# Patient Record
Sex: MALE | Race: WHITE | Employment: FULL TIME | URBAN - METROPOLITAN AREA
[De-identification: names, ages, dates, MRNs, and addresses within clinical notes are randomized per-mention and may not be internally consistent; named-entity substitution may affect disease eponyms.]

---

## 2017-01-20 DIAGNOSIS — E66.01 MORBID OBESITY DUE TO EXCESS CALORIES (H): ICD-10-CM

## 2017-01-20 DIAGNOSIS — E11.9 TYPE 2 DIABETES MELLITUS WITHOUT COMPLICATION (H): Primary | ICD-10-CM

## 2017-01-20 NOTE — TELEPHONE ENCOUNTER
Lisinopril      Last Written Prescription Date: 4/14/16  Last Fill Quantity: 90, # refills: 3  Last Office Visit with [facility], [facility] or [facility] prescribing provider: 4/14/16       POTASSIUM   Date Value Ref Range Status   04/14/2016 4.2 3.4 - 5.3 mmol/L Final     CREATININE   Date Value Ref Range Status   04/14/2016 0.60* 0.66 - 1.25 mg/dL Final     BP Readings from Last 3 Encounters:   04/14/16 110/84   03/20/15 120/78   11/20/14 120/80     Januvia         Last Written Prescription Date: 4/14/16  Last Fill Quantity: 90, # refills: 3  Last Office Visit with [facility], [facility] or [facility] prescribing provider:  4/14/16        BP Readings from Last 3 Encounters:   04/14/16 110/84   03/20/15 120/78   11/20/14 120/80     MICROL        8   8/23/2014  No results found for this basename: microalbumin  CREATININE   Date Value Ref Range Status   04/14/2016 0.60* 0.66 - 1.25 mg/dL Final   ]  GFR ESTIMATE   Date Value Ref Range Status   04/14/2016 >90  Non  GFR Calc   >60 mL/min/1.7m2 Final   08/23/2014 >90  Non  GFR Calc   >60 mL/min/1.7m2 Final   07/08/2013 >90 >60 mL/min/1.7m2 Final     GFR ESTIMATE IF BLACK   Date Value Ref Range Status   04/14/2016 >90   GFR Calc   >60 mL/min/1.7m2 Final   08/23/2014 >90   GFR Calc   >60 mL/min/1.7m2 Final   07/08/2013 >90 >60 mL/min/1.7m2 Final     CHOL      210   4/14/2016  HDL       34   4/14/2016  LDL      135   4/14/2016  TRIG      206   4/14/2016  CHOLHDLRATIO      4.3   8/23/2014  AST       17   8/23/2014  ALT       39   8/23/2014  A1C     10.8   4/14/2016  A1C      9.1   3/20/2015  A1C     10.0   8/23/2014  A1C      7.3   7/8/2013  POTASSIUM   Date Value Ref Range Status   04/14/2016 4.2 3.4 - 5.3 mmol/L Final       Labs showing if normal/abnormal  Lab Results   Component Value Date    UCRR 144 08/23/2014    MICROL 8 08/23/2014     Lab Results   Component Value Date    CHOL 210* 04/14/2016    TRIG 206* 04/14/2016    HDL 34*  04/14/2016    * 04/14/2016    VLDL 22 08/23/2014    CHOLHDLRATIO 4.3 08/23/2014     Lab Results   Component Value Date    A1C 10.8* 04/14/2016    A1C 9.1* 03/20/2015    A1C 10.0* 08/23/2014

## 2017-01-24 RX ORDER — LISINOPRIL 2.5 MG/1
2.5 TABLET ORAL DAILY
Qty: 30 TABLET | Refills: 0 | Status: SHIPPED | OUTPATIENT
Start: 2017-01-24 | End: 2017-04-25

## 2017-02-15 DIAGNOSIS — N52.1 ERECTILE DYSFUNCTION DUE TO DISEASES CLASSIFIED ELSEWHERE: ICD-10-CM

## 2017-02-15 RX ORDER — TADALAFIL 20 MG/1
20 TABLET ORAL DAILY
Qty: 18 TABLET | Refills: 0 | Status: CANCELLED | OUTPATIENT
Start: 2017-02-15

## 2017-02-15 NOTE — TELEPHONE ENCOUNTER
Cialis      Last Written Prescription Date: 12/02/16  Last Fill Quantity: 18,  # refills: 0   Last Office Visit with [facility], [facility] or [facility] prescribing provider: 04/14/16    Plan will probably only pay for Viagra

## 2017-02-16 RX ORDER — SILDENAFIL 100 MG/1
100 TABLET, FILM COATED ORAL DAILY PRN
Qty: 18 TABLET | Refills: 3 | Status: SHIPPED | OUTPATIENT
Start: 2017-02-16 | End: 2017-04-25 | Stop reason: ALTCHOICE

## 2017-03-26 DIAGNOSIS — E11.9 TYPE 2 DIABETES, HBA1C GOAL < 8% (H): ICD-10-CM

## 2017-03-26 NOTE — LETTER
4/14/2017     Riya Butt  [address] [address] [facility] [address]      Dear Riya:      We have been unable to reach you by phone. You are past due for an appointment and lab work. Please call [phone] when you can. Thank you            Precious ACMACHO

## 2017-03-27 NOTE — TELEPHONE ENCOUNTER
Metformin         Last Written Prescription Date: 4/14/16  Last Fill Quantity: 180, # refills: 3  Last Office Visit with [facility] or [facility] prescribing provider:  4/14/16      Per 4/19/16 phone note-Please advise pt A1C elevated to 10.8 and FLP elevated. Recommend adding once daily insulin to current medications. eRx sent, lantus solostar 20 U daily, referral to diabetes educator for instruction and to readdress diet issues. Add lipitor for cholesterol. See me in 1 month.  Leeanne Robin CNP    Called pt-left message     BP Readings from Last 3 Encounters:   04/14/16 110/84   03/20/15 120/78   11/20/14 120/80     Lab Results   Component Value Date    MICROL 8 08/23/2014     No results found for: MICROALBUMIN  Creatinine   Date Value Ref Range Status   04/14/2016 0.60 (L) 0.66 - 1.25 mg/dL Final   ]  GFR Estimate   Date Value Ref Range Status   04/14/2016 >90  Non  GFR Calc   >60 mL/min/1.7m2 Final   08/23/2014 >90  Non  GFR Calc   >60 mL/min/1.7m2 Final   07/08/2013 >90 >60 mL/min/1.7m2 Final     GFR Estimate If Black   Date Value Ref Range Status   04/14/2016 >90   GFR Calc   >60 mL/min/1.7m2 Final   08/23/2014 >90   GFR Calc   >60 mL/min/1.7m2 Final   07/08/2013 >90 >60 mL/min/1.7m2 Final     Lab Results   Component Value Date    CHOL 210 04/14/2016     Lab Results   Component Value Date    HDL 34 04/14/2016     Lab Results   Component Value Date     04/14/2016     Lab Results   Component Value Date    TRIG 206 04/14/2016     Lab Results   Component Value Date    CHOLHDLRATIO 4.3 08/23/2014     Lab Results   Component Value Date    AST 17 08/23/2014     Lab Results   Component Value Date    ALT 39 08/23/2014     Lab Results   Component Value Date    A1C 10.8 04/14/2016    A1C 9.1 03/20/2015    A1C 10.0 08/23/2014    A1C 7.3 07/08/2013     Potassium   Date Value Ref Range Status   04/14/2016 4.2 3.4 - 5.3 mmol/L Final       Labs showing if  normal/abnormal  Lab Results   Component Value Date    UCRR 144 08/23/2014    MICROL 8 08/23/2014     Lab Results   Component Value Date    CHOL 210 (H) 04/14/2016    TRIG 206 (H) 04/14/2016    HDL 34 (L) 04/14/2016     (H) 04/14/2016    VLDL 22 08/23/2014    CHOLHDLRATIO 4.3 08/23/2014     Lab Results   Component Value Date    A1C 10.8 (H) 04/14/2016    A1C 9.1 (H) 03/20/2015    A1C 10.0 (H) 08/23/2014

## 2017-04-03 NOTE — TELEPHONE ENCOUNTER
Call pt home number and left a message to call us back.  Called cell phone but couldn't leave a message VM box full.    Anson MANCERA RN

## 2017-04-07 NOTE — TELEPHONE ENCOUNTER
Attempted to contact pt. No VM at mobile phone.     Call to Iris, wife and left message for pt to call clinic.

## 2017-04-17 ENCOUNTER — TRANSFERRED RECORDS (OUTPATIENT)
Dept: HEALTH INFORMATION MANAGEMENT | Facility: CLINIC | Age: 47
End: 2017-04-17

## 2017-04-25 ENCOUNTER — OFFICE VISIT (OUTPATIENT)
Dept: INTERNAL MEDICINE | Facility: CLINIC | Age: 47
End: 2017-04-25
Payer: COMMERCIAL

## 2017-04-25 VITALS
DIASTOLIC BLOOD PRESSURE: 78 MMHG | HEART RATE: 84 BPM | OXYGEN SATURATION: 84 % | RESPIRATION RATE: 14 BRPM | HEIGHT: 68 IN | WEIGHT: 225 LBS | TEMPERATURE: 98.6 F | SYSTOLIC BLOOD PRESSURE: 128 MMHG | BODY MASS INDEX: 34.1 KG/M2

## 2017-04-25 DIAGNOSIS — E11.01 TYPE 2 DIABETES MELLITUS WITH HYPEROSMOLAR COMA, WITH LONG-TERM CURRENT USE OF INSULIN (H): ICD-10-CM

## 2017-04-25 DIAGNOSIS — I10 BENIGN ESSENTIAL HYPERTENSION: Primary | ICD-10-CM

## 2017-04-25 DIAGNOSIS — E11.00 TYPE 2 DIABETES MELLITUS WITH HYPEROSMOLARITY WITHOUT COMA, WITHOUT LONG-TERM CURRENT USE OF INSULIN (H): ICD-10-CM

## 2017-04-25 DIAGNOSIS — Z79.4 TYPE 2 DIABETES MELLITUS WITH HYPEROSMOLAR COMA, WITH LONG-TERM CURRENT USE OF INSULIN (H): ICD-10-CM

## 2017-04-25 DIAGNOSIS — E78.5 HYPERLIPIDEMIA WITH TARGET LDL LESS THAN 100: ICD-10-CM

## 2017-04-25 LAB
ERYTHROCYTE [DISTWIDTH] IN BLOOD BY AUTOMATED COUNT: 13.6 % (ref 10–15)
HBA1C MFR BLD: 10.1 % (ref 4.3–6)
HCT VFR BLD AUTO: 47.6 % (ref 40–53)
HGB BLD-MCNC: 16.7 G/DL (ref 13.3–17.7)
MCH RBC QN AUTO: 28.5 PG (ref 26.5–33)
MCHC RBC AUTO-ENTMCNC: 35.1 G/DL (ref 31.5–36.5)
MCV RBC AUTO: 81 FL (ref 78–100)
PLATELET # BLD AUTO: 272 10E9/L (ref 150–450)
RBC # BLD AUTO: 5.86 10E12/L (ref 4.4–5.9)
WBC # BLD AUTO: 8.8 10E9/L (ref 4–11)

## 2017-04-25 PROCEDURE — 99212 OFFICE O/P EST SF 10 MIN: CPT | Performed by: NURSE PRACTITIONER

## 2017-04-25 PROCEDURE — 85027 COMPLETE CBC AUTOMATED: CPT | Performed by: NURSE PRACTITIONER

## 2017-04-25 PROCEDURE — 80061 LIPID PANEL: CPT | Performed by: NURSE PRACTITIONER

## 2017-04-25 PROCEDURE — 84460 ALANINE AMINO (ALT) (SGPT): CPT | Performed by: NURSE PRACTITIONER

## 2017-04-25 PROCEDURE — 80048 BASIC METABOLIC PNL TOTAL CA: CPT | Performed by: NURSE PRACTITIONER

## 2017-04-25 PROCEDURE — 82043 UR ALBUMIN QUANTITATIVE: CPT | Performed by: NURSE PRACTITIONER

## 2017-04-25 PROCEDURE — 83036 HEMOGLOBIN GLYCOSYLATED A1C: CPT | Performed by: NURSE PRACTITIONER

## 2017-04-25 PROCEDURE — 36415 COLL VENOUS BLD VENIPUNCTURE: CPT | Performed by: NURSE PRACTITIONER

## 2017-04-25 RX ORDER — LISINOPRIL 2.5 MG/1
2.5 TABLET ORAL DAILY
Qty: 90 TABLET | Refills: 3 | Status: SHIPPED | OUTPATIENT
Start: 2017-04-25 | End: 2017-04-26

## 2017-04-25 NOTE — PROGRESS NOTES
SUBJECTIVE:                                                    Riya Butt is a 46 year old male who presents to clinic today for the following health issues:      Diabetes Follow-up    Patient is checking blood sugars: twice daily.    Blood sugar testing frequency justification: uncontrolled diabetes  Results are as follows:         am - 110-160              postprandial after supper-     Diabetic concerns: None     Symptoms of hypoglycemia (low blood sugar): none     Paresthesias (numbness or burning in feet) or sores: No     Date of last diabetic eye exam: 1 year ago     Hyperlipidemia Follow-Up      Rate your low fat/cholesterol diet?: good    Taking statin?  No    Other lipid medications/supplements?:  none     Hypertension Follow-up      Outpatient blood pressures are not being checked.    Low Salt Diet: no added salt       Amount of exercise or physical activity: daily activities    Problems taking medications regularly: No    Medication side effects: none    Diet: low salt and low fat/cholesterol          Problem list and histories reviewed & adjusted, as indicated.  Additional history: as documented        Reviewed and updated as needed this visit by clinical staff  Tobacco  Allergies  Meds  Med Hx  Surg Hx  Fam Hx  Soc Hx      Reviewed and updated as needed this visit by Provider           (I10) Benign essential hypertension  (primary encounter diagnosis)  Comment:   Plan: CBC with platelets, DISCONTINUED: lisinopril         (PRINIVIL/ZESTRIL) 2.5 MG tablet            (E11.9) Type 2 diabetes mellitus without complication (H)  Comment:   Plan:     (E78.5) Hyperlipidemia with target LDL less than 100  Comment:   Plan: Lipid Profile, ALT            (E11.00) Type 2 diabetes mellitus with hyperosmolarity without coma, without long-term current use of insulin (H)  Comment:   Plan: order for DME, Basic metabolic panel,         Hemoglobin A1c, Albumin Random Urine         Quantitative,  DISCONTINUED: sitagliptin         (JANUVIA) 100 MG tablet, DISCONTINUED:         metFORMIN (GLUCOPHAGE) 1000 MG tablet            Leeanne Robin CNP

## 2017-04-25 NOTE — MR AVS SNAPSHOT
"              After Visit Summary   4/25/2017    Riya Butt    MRN: [id number]           Patient Information     Date Of Birth          1970        Visit Information        Provider Department      4/25/2017 4:00 PM Leeanne Robin NP [facility]        Today's Diagnoses     Benign essential hypertension    -  1    Hyperlipidemia with target LDL less than 100        Type 2 diabetes mellitus with hyperosmolarity without coma, without long-term current use of insulin (H)        Type 2 diabetes mellitus with hyperosmolar coma, with long-term current use of insulin (H)           Follow-ups after your visit        Who to contact     If you have questions or need follow up information about today's clinic visit or your schedule please contact [facility] directly at [phone].  Normal or non-critical lab and imaging results will be communicated to you by Mindframehart, letter or phone within 4 business days after the clinic has received the results. If you do not hear from us within 7 days, please contact the clinic through Mindframehart or phone. If you have a critical or abnormal lab result, we will notify you by phone as soon as possible.  Submit refill requests through [email or web address] or call your pharmacy and they will forward the refill request to us. Please allow 3 business days for your refill to be completed.          Additional Information About Your Visit        MyCharAposense Information     [email or web address] lets you send messages to your doctor, view your test results, renew your prescriptions, schedule appointments and more. To sign up, go to [email or web address] . Click on \"Log in\" on the left side of the screen, which will take you to the Welcome page. Then click on \"Sign up Now\" on the right side of the page.     You will be asked to enter the access code listed below, as well as some personal information. Please follow the directions to create your username and password.     Your access " "code is: [email or web address]  Expires: 2018  1:25 PM     Your access code will  in 90 days. If you need help or a new code, please call your [facility] or [phone].        Care EveryWhere ID     This is your Care EveryWhere ID. This could be used by other organizations to access your [facility] medical records  [id number]        Your Vitals Were     Pulse Temperature Respirations Height Pulse Oximetry BMI (Body Mass Index)    84 98.6  F (37  C) (Oral) 14 5' 8\" (1.727 m) 84% 34.21 kg/m2       Blood Pressure from Last 3 Encounters:   17 128/78   16 110/84   03/20/15 120/78    Weight from Last 3 Encounters:   17 225 lb (102.1 kg)   16 233 lb 14.4 oz (106.1 kg)   03/20/15 237 lb (107.5 kg)              We Performed the Following     Albumin Random Urine Quantitative     ALT     Basic metabolic panel     CBC with platelets     Hemoglobin A1c     Lipid Profile          Today's Medication Changes          These changes are accurate as of: 17 11:59 PM.  If you have any questions, ask your nurse or doctor.               Stop taking these medicines if you haven't already. Please contact your care team if you have questions.     sildenafil 100 MG tablet   Commonly known as:  VIAGRA   Stopped by:  Leeanne Robin NP                Where to get your medicines      These medications were sent to [facility] [address] [address]     Phone:  [phone]     lisinopril 2.5 MG tablet    metFORMIN 1000 MG tablet    sitagliptin 100 MG tablet         Some of these will need a paper prescription and others can be bought over the counter.  Ask your nurse if you have questions.     Bring a paper prescription for each of these medications     order for DME                Primary Care Provider Office Phone # Fax #    Whitley Bush [phone]       [address]  [facility] [address]      "   Equal Access to Services     [facility] Center[facility] : Hadii aad ku hadalekseykarie Rejiali, waaxda luqadaha, qaybta kaelinagavino ness, yoan tracy. So [facility] [phone].    ATENCIÓN: Si habla español, tiene a portillo disposición servicios gratuitos de asistencia lingüística. Fortinoame al [phone].    We comply with applicable federal civil rights laws and [place] laws. We do not discriminate on the basis of race, color, national origin, age, disability, sex, sexual orientation, or gender identity.            Thank you!     Thank you for choosing [facility]  for your care. Our goal is always to provide you with excellent care. Hearing back from our patients is one way we can continue to improve our services. Please take a few minutes to complete the written survey that you may receive in the mail after your visit with us. Thank you!             Your Updated Medication List - Protect others around you: Learn how to safely use, store and throw away your medicines at www.disposemymeds.org.          This list is accurate as of: 4/25/17 11:59 PM.  Always use your most recent med list.                   Brand Name Dispense Instructions for use Diagnosis    insulin pen needle 31G X 6 MM     100 each    Use  daily or as directed.    Type 2 diabetes mellitus without complication (H)       lisinopril 2.5 MG tablet    PRINIVIL/Zestril    90 tablet    Take 1 tablet (2.5 mg) by mouth daily    Benign essential hypertension       metFORMIN 1000 MG tablet    GLUCOPHAGE    180 tablet    Take 1 tablet (1,000 mg) by mouth 2 times daily (with meals)    Type 2 diabetes mellitus with hyperosmolarity without coma, without long-term current use of insulin (H)       * order for DME     3 Month    Equipment being ordered: All diabetic testing supplies including test strips, lancets and solution for testing 3 times per day.    Type 2 diabetes, HbA1C goal < 8% (H)       * order for DME     1 each    Glucometer,  brand as covered by insurance.    Type 2 diabetes mellitus with hyperosmolarity without coma, without long-term current use of insulin (H)       sitagliptin 100 MG tablet    JANUVIA    90 tablet    Take 1 tablet (100 mg) by mouth daily    Type 2 diabetes mellitus with hyperosmolarity without coma, without long-term current use of insulin (H)       * Notice:  This list has 2 medication(s) that are the same as other medications prescribed for you. Read the directions carefully, and ask your doctor or other care provider to review them with you.

## 2017-04-25 NOTE — NURSING NOTE
"Chief Complaint   Patient presents with     Diabetes     Hypertension     Lipids       Initial /78 (BP Location: Right arm, Patient Position: Chair, Cuff Size: Adult Large)  Pulse 84  Temp 98.6  F (37  C) (Oral)  Resp 14  Ht 5' 8\" (1.727 m)  Wt 225 lb (102.1 kg)  SpO2 (!) 84%  BMI 34.21 kg/m2 Estimated body mass index is 34.21 kg/(m^2) as calculated from the following:    Height as of this encounter: 5' 8\" (1.727 m).    Weight as of this encounter: 225 lb (102.1 kg).  Medication Reconciliation: complete   Charleen MACIEL      "

## 2017-04-26 ENCOUNTER — TELEPHONE (OUTPATIENT)
Dept: INTERNAL MEDICINE | Facility: CLINIC | Age: 47
End: 2017-04-26

## 2017-04-26 DIAGNOSIS — E11.00 TYPE 2 DIABETES MELLITUS WITH HYPEROSMOLARITY WITHOUT COMA, WITHOUT LONG-TERM CURRENT USE OF INSULIN (H): ICD-10-CM

## 2017-04-26 DIAGNOSIS — E11.9 TYPE 2 DIABETES MELLITUS WITHOUT COMPLICATION, WITHOUT LONG-TERM CURRENT USE OF INSULIN (H): Primary | ICD-10-CM

## 2017-04-26 DIAGNOSIS — E78.5 HYPERLIPIDEMIA WITH TARGET LDL LESS THAN 100: ICD-10-CM

## 2017-04-26 DIAGNOSIS — I10 BENIGN ESSENTIAL HYPERTENSION: ICD-10-CM

## 2017-04-26 LAB
ALT SERPL W P-5'-P-CCNC: 41 U/L (ref 0–70)
ANION GAP SERPL CALCULATED.3IONS-SCNC: 7 MMOL/L (ref 3–14)
BUN SERPL-MCNC: 10 MG/DL (ref 7–30)
CALCIUM SERPL-MCNC: 9 MG/DL (ref 8.5–10.1)
CHLORIDE SERPL-SCNC: 102 MMOL/L (ref 94–109)
CHOLEST SERPL-MCNC: 210 MG/DL
CO2 SERPL-SCNC: 28 MMOL/L (ref 20–32)
CREAT SERPL-MCNC: 0.69 MG/DL (ref 0.66–1.25)
CREAT UR-MCNC: 96 MG/DL
GFR SERPL CREATININE-BSD FRML MDRD: ABNORMAL ML/MIN/1.7M2
GLUCOSE SERPL-MCNC: 193 MG/DL (ref 70–99)
HDLC SERPL-MCNC: 37 MG/DL
LDLC SERPL CALC-MCNC: 153 MG/DL
MICROALBUMIN UR-MCNC: 12 MG/L
MICROALBUMIN/CREAT UR: 12.6 MG/G CR (ref 0–17)
NONHDLC SERPL-MCNC: 173 MG/DL
POTASSIUM SERPL-SCNC: 4.1 MMOL/L (ref 3.4–5.3)
SODIUM SERPL-SCNC: 137 MMOL/L (ref 133–144)
TRIGL SERPL-MCNC: 102 MG/DL

## 2017-04-26 RX ORDER — ATORVASTATIN CALCIUM 10 MG/1
10 TABLET, FILM COATED ORAL DAILY
Qty: 90 TABLET | Refills: 1 | Status: SHIPPED | OUTPATIENT
Start: 2017-04-26 | End: 2017-10-03

## 2017-04-26 RX ORDER — LISINOPRIL 2.5 MG/1
2.5 TABLET ORAL DAILY
Qty: 14 TABLET | Refills: 0 | Status: SHIPPED | OUTPATIENT
Start: 2017-04-26 | End: 2018-06-14

## 2017-04-26 RX ORDER — PIOGLITAZONEHYDROCHLORIDE 30 MG/1
30 TABLET ORAL DAILY
Qty: 90 TABLET | Refills: 3 | Status: SHIPPED | OUTPATIENT
Start: 2017-04-26 | End: 2018-01-17

## 2017-04-26 NOTE — TELEPHONE ENCOUNTER
Pt calling.  Waiting for mail-order to arrive.  Needs 14 day supply faxed to local pharm in the mean time.    Lisinopril, Januvia, and Metformin faxed to local pharm for 14 day supply.

## 2017-04-26 NOTE — TELEPHONE ENCOUNTER
Please advise pt that A1C is 10.1 and cholesterol elevated.  New eRx daily actos and refill lipitor sent to mail order pharmacy.  Recheck labs in 3 months  Leeanne Robin CNP

## 2017-05-09 DIAGNOSIS — I10 BENIGN ESSENTIAL HYPERTENSION: ICD-10-CM

## 2017-05-09 DIAGNOSIS — E11.00 TYPE 2 DIABETES MELLITUS WITH HYPEROSMOLARITY WITHOUT COMA, WITHOUT LONG-TERM CURRENT USE OF INSULIN (H): ICD-10-CM

## 2017-05-09 RX ORDER — SITAGLIPTIN 100 MG/1
TABLET, FILM COATED ORAL
Qty: 14 TABLET | Refills: 0 | OUTPATIENT
Start: 2017-05-09

## 2017-05-09 RX ORDER — LISINOPRIL 2.5 MG/1
TABLET ORAL
Qty: 14 TABLET | Refills: 0 | OUTPATIENT
Start: 2017-05-09

## 2017-05-09 NOTE — TELEPHONE ENCOUNTER
Lisinopril      Last Written Prescription Date: 04/26/17  Last Fill Quantity: 14, # refills: 0  Last Office Visit with [facility], [facility] or [facility] prescribing provider: 04/25/17       Potassium   Date Value Ref Range Status   04/25/2017 4.1 3.4 - 5.3 mmol/L Final     Creatinine   Date Value Ref Range Status   04/25/2017 0.69 0.66 - 1.25 mg/dL Final     BP Readings from Last 3 Encounters:   04/25/17 128/78   04/14/16 110/84   03/20/15 120/78     Metformin  AND Januvia       Last Written Prescription Date: 04/26/17  BOTH  Last Fill Quantity: 28  AND 14, # refills: 0  BOTH  Last Office Visit with [facility], [facility] or [facility] prescribing provider:  04/25/17        BP Readings from Last 3 Encounters:   04/25/17 128/78   04/14/16 110/84   03/20/15 120/78     Lab Results   Component Value Date    MICROL 12 04/25/2017     Lab Results   Component Value Date    UMALCR 12.60 04/25/2017     Creatinine   Date Value Ref Range Status   04/25/2017 0.69 0.66 - 1.25 mg/dL Final   ]  GFR Estimate   Date Value Ref Range Status   04/25/2017 >90  Non  GFR Calc   >60 mL/min/1.7m2 Final   04/14/2016 >90  Non  GFR Calc   >60 mL/min/1.7m2 Final   08/23/2014 >90  Non  GFR Calc   >60 mL/min/1.7m2 Final     GFR Estimate If Black   Date Value Ref Range Status   04/25/2017 >90   GFR Calc   >60 mL/min/1.7m2 Final   04/14/2016 >90   GFR Calc   >60 mL/min/1.7m2 Final   08/23/2014 >90   GFR Calc   >60 mL/min/1.7m2 Final     Lab Results   Component Value Date    CHOL 210 04/25/2017     Lab Results   Component Value Date    HDL 37 04/25/2017     Lab Results   Component Value Date     04/25/2017     Lab Results   Component Value Date    TRIG 102 04/25/2017     Lab Results   Component Value Date    CHOLHDLRATIO 4.3 08/23/2014     Lab Results   Component Value Date    AST 17 08/23/2014     Lab Results   Component Value Date    ALT 41 04/25/2017     Lab Results    Component Value Date    A1C 10.1 04/25/2017    A1C 10.8 04/14/2016    A1C 9.1 03/20/2015    A1C 10.0 08/23/2014    A1C 7.3 07/08/2013     Potassium   Date Value Ref Range Status   04/25/2017 4.1 3.4 - 5.3 mmol/L Final       Labs showing if normal/abnormal  Lab Results   Component Value Date    UCRR 96 04/25/2017    MICROL 12 04/25/2017     Lab Results   Component Value Date    CHOL 210 (H) 04/25/2017    TRIG 102 04/25/2017    HDL 37 (L) 04/25/2017     (H) 04/25/2017    VLDL 22 08/23/2014    CHOLHDLRATIO 4.3 08/23/2014     Lab Results   Component Value Date    A1C 10.1 (H) 04/25/2017    A1C 10.8 (H) 04/14/2016    A1C 9.1 (H) 03/20/2015

## 2017-05-09 NOTE — TELEPHONE ENCOUNTER
Per 4/26 refill entry-Pt calling. Waiting for mail-order to arrive. Needs 14 day supply faxed to local pharm in the mean time.     Lisinopril, Januvia, and Metformin faxed to local pharm for 14 day supply.

## 2017-07-14 DIAGNOSIS — E11.00 TYPE 2 DIABETES MELLITUS WITH HYPEROSMOLARITY WITHOUT COMA, WITHOUT LONG-TERM CURRENT USE OF INSULIN (H): ICD-10-CM

## 2017-07-14 NOTE — TELEPHONE ENCOUNTER
ONE TOUCH ULTRA         Last Written Prescription Date: 03/20/15  Last Fill Quantity: 3 MONTHS, # refills: 1  Last Office Visit with [facility], [facility] or [facility] prescribing provider:  04/25/17        BP Readings from Last 3 Encounters:   04/25/17 128/78   04/14/16 110/84   03/20/15 120/78     Lab Results   Component Value Date    MICROL 12 04/25/2017     Lab Results   Component Value Date    UMALCR 12.60 04/25/2017     Creatinine   Date Value Ref Range Status   04/25/2017 0.69 0.66 - 1.25 mg/dL Final   ]  GFR Estimate   Date Value Ref Range Status   04/25/2017 >90  Non  GFR Calc   >60 mL/min/1.7m2 Final   04/14/2016 >90  Non  GFR Calc   >60 mL/min/1.7m2 Final   08/23/2014 >90  Non  GFR Calc   >60 mL/min/1.7m2 Final     GFR Estimate If Black   Date Value Ref Range Status   04/25/2017 >90   GFR Calc   >60 mL/min/1.7m2 Final   04/14/2016 >90   GFR Calc   >60 mL/min/1.7m2 Final   08/23/2014 >90   GFR Calc   >60 mL/min/1.7m2 Final     Lab Results   Component Value Date    CHOL 210 04/25/2017     Lab Results   Component Value Date    HDL 37 04/25/2017     Lab Results   Component Value Date     04/25/2017     Lab Results   Component Value Date    TRIG 102 04/25/2017     Lab Results   Component Value Date    CHOLHDLRATIO 4.3 08/23/2014     Lab Results   Component Value Date    AST 17 08/23/2014     Lab Results   Component Value Date    ALT 41 04/25/2017     Lab Results   Component Value Date    A1C 10.1 04/25/2017    A1C 10.8 04/14/2016    A1C 9.1 03/20/2015    A1C 10.0 08/23/2014    A1C 7.3 07/08/2013     Potassium   Date Value Ref Range Status   04/25/2017 4.1 3.4 - 5.3 mmol/L Final

## 2017-10-03 DIAGNOSIS — E11.00 TYPE 2 DIABETES MELLITUS WITH HYPEROSMOLARITY WITHOUT COMA, WITHOUT LONG-TERM CURRENT USE OF INSULIN (H): ICD-10-CM

## 2017-10-03 DIAGNOSIS — E78.5 HYPERLIPIDEMIA WITH TARGET LDL LESS THAN 100: ICD-10-CM

## 2017-10-03 NOTE — TELEPHONE ENCOUNTER
Per 4/26 phone note-Please advise pt that A1C is 10.1 and cholesterol elevated.  New eRx daily actos and refill lipitor sent to mail order pharmacy.  Recheck labs in 3 months  Leeanne Robin CNP    Called pt-left message

## 2017-10-03 NOTE — TELEPHONE ENCOUNTER
One Touch Ultra test strips      Last Written Prescription Date: 07.17.17  Last Fill Quantity: 300,  # refills: 0   Last Office Visit with G, [facility] or [facility] prescribing provider: 04/25/17

## 2017-10-03 NOTE — TELEPHONE ENCOUNTER
High A1C      Test strips      Last Written Prescription Date: 7/17/17  Last Fill Quantity: 300,  # refills: 0   Last Office Visit with [facility], [facility] or [facility] prescribing provider: 4/25/17                                                   Per 4/26 phone note-Please advise pt that A1C is 10.1 and cholesterol elevated.  New eRx daily actos and refill lipitor sent to mail order pharmacy.  Recheck labs in 3 months  Leeanne Robin CNP    Called pt-left message

## 2017-10-03 NOTE — TELEPHONE ENCOUNTER
Atorvastatin     Last Written Prescription Date: 04/26/17  Last Fill Quantity: 90, # refills: 1  Last Office Visit with [facility] or [facility] prescribing provider: 04/25/17       Lab Results   Component Value Date    CHOL 210 04/25/2017     Lab Results   Component Value Date    HDL 37 04/25/2017     Lab Results   Component Value Date     04/25/2017     Lab Results   Component Value Date    TRIG 102 04/25/2017     Lab Results   Component Value Date    CHOLHDLRATIO 4.3 08/23/2014       Labs showing if normal/abnormal  Lab Results   Component Value Date    CHOL 210 (H) 04/25/2017    TRIG 102 04/25/2017    HDL 37 (L) 04/25/2017     (H) 04/25/2017    VLDL 22 08/23/2014    CHOLHDLRATIO 4.3 08/23/2014

## 2017-10-06 ENCOUNTER — TELEPHONE (OUTPATIENT)
Dept: INTERNAL MEDICINE | Facility: CLINIC | Age: 47
End: 2017-10-06

## 2017-10-06 DIAGNOSIS — E11.9 TYPE 2 DIABETES MELLITUS WITHOUT COMPLICATION, WITHOUT LONG-TERM CURRENT USE OF INSULIN (H): Primary | ICD-10-CM

## 2017-10-06 RX ORDER — LANCETS 28 GAUGE
EACH MISCELLANEOUS
Qty: 300 EACH | Refills: 0 | Status: SHIPPED | OUTPATIENT
Start: 2017-10-06 | End: 2018-06-14

## 2017-10-06 NOTE — TELEPHONE ENCOUNTER
[facility] with [facility] left voice message at [phone]. They received prescription for One Touch Ultra Test strips, but these are not covered by insurance unless pt uses them with an insulin pump. They state preferred alternative is FreeStyle LIte. Requesting call back if okay to change or if pt is using a insulin pump.  Phone: [phone]  Reference # [id number]    Contacted embraase, spoke to [facility]. Advised pt is not using insulin pump and okay to change to FreeStyle Lite test strips. Asked if they need new script for glucometer, [facility] states they will send new glucometer for pt at no additional charge.     Dorcas Foy, Pharmacist took verbal order for test strips and will also include a new meter. They will need new prescription for FreeStyle Lancets. New prescription sent to pharmacy for lancets.

## 2017-10-27 RX ORDER — ATORVASTATIN CALCIUM 10 MG/1
TABLET, FILM COATED ORAL
Qty: 90 TABLET | Refills: 0 | Status: SHIPPED | OUTPATIENT
Start: 2017-10-27 | End: 2018-01-17

## 2017-10-27 NOTE — TELEPHONE ENCOUNTER
Pt also due for diabetes follow up.  Called cell phone, his wife answered. Consent to communicate on file for her. Informed her pt due for office visit and labs. She scheduled an appt for pt.   Next 5 appointments (look out 90 days)     Nov 17, 2017  8:20 AM CST   [facility] Visit with Whitley Bush MD   [facility]    [address]  [facility] [id number] [phone]                 Medication is being filled for 1 time refill only due to:  future appt scheduled

## 2018-01-04 PROBLEM — E11.00 TYPE 2 DIABETES MELLITUS WITH HYPEROSMOLARITY WITHOUT COMA, WITHOUT LONG-TERM CURRENT USE OF INSULIN (H): Status: ACTIVE | Noted: 2018-01-04

## 2018-01-17 DIAGNOSIS — I10 BENIGN ESSENTIAL HYPERTENSION: ICD-10-CM

## 2018-01-17 DIAGNOSIS — E11.00 TYPE 2 DIABETES MELLITUS WITH HYPEROSMOLARITY WITHOUT COMA, WITHOUT LONG-TERM CURRENT USE OF INSULIN (H): ICD-10-CM

## 2018-01-17 DIAGNOSIS — E11.9 TYPE 2 DIABETES MELLITUS WITHOUT COMPLICATION, WITHOUT LONG-TERM CURRENT USE OF INSULIN (H): ICD-10-CM

## 2018-01-17 DIAGNOSIS — N52.1 ERECTILE DYSFUNCTION DUE TO DISEASES CLASSIFIED ELSEWHERE: Primary | ICD-10-CM

## 2018-01-17 DIAGNOSIS — E78.5 HYPERLIPIDEMIA WITH TARGET LDL LESS THAN 100: ICD-10-CM

## 2018-01-23 NOTE — TELEPHONE ENCOUNTER
Pt is due for OV, labs.   One no show in November.   Attempted to contact pt. Left message to call clinic.         Lab Results   Component Value Date    A1C 10.1 04/25/2017    A1C 10.8 04/14/2016    A1C 9.1 03/20/2015    A1C 10.0 08/23/2014    A1C 7.3 07/08/2013

## 2018-01-25 NOTE — TELEPHONE ENCOUNTER
"Requested Prescriptions   Pending Prescriptions Disp Refills     atorvastatin (LIPITOR) 10 MG tablet [Pharmacy Med Name: ATORVASTATIN TABS 10MG] 90 tablet 0     Sig: TAKE 1 TABLET DAILY    Statins Protocol Passed    1/17/2018  6:19 PM       Passed - LDL on file in past 12 months    Recent Labs   Lab Test  04/25/17   1639   LDL  153*            Passed - No abnormal creatine kinase in past 12 months    No lab results found.         Passed - Recent or future visit with authorizing provider    Patient had office visit in the last year or has a visit in the next 30 days with authorizing provider.  See \"Patient Info\" tab in inbasket, or \"Choose Columns\" in Meds & Orders section of the refill encounter.            Passed - Patient is age 18 or older        Last office visit 4/25/17.      Routing refill request to provider for review/approval because:  Shirley given x1 and patient did not follow up, please advise.  Pt is due for fasting labs and diabetic follow up.  Nadeen Marshall RN          "

## 2018-01-26 RX ORDER — ATORVASTATIN CALCIUM 10 MG/1
TABLET, FILM COATED ORAL
Qty: 90 TABLET | Refills: 0 | Status: SHIPPED | OUTPATIENT
Start: 2018-01-26 | End: 2018-06-14

## 2018-02-22 RX ORDER — SITAGLIPTIN 100 MG/1
TABLET, FILM COATED ORAL
Qty: 90 TABLET | Refills: 0 | Status: SHIPPED | OUTPATIENT
Start: 2018-02-22 | End: 2018-06-14

## 2018-02-22 RX ORDER — PIOGLITAZONEHYDROCHLORIDE 30 MG/1
TABLET ORAL
Qty: 90 TABLET | Refills: 0 | Status: SHIPPED | OUTPATIENT
Start: 2018-02-22 | End: 2018-08-13

## 2018-02-22 RX ORDER — LISINOPRIL 2.5 MG/1
TABLET ORAL
Qty: 90 TABLET | Refills: 0 | Status: SHIPPED | OUTPATIENT
Start: 2018-02-22 | End: 2018-06-14

## 2018-02-22 NOTE — TELEPHONE ENCOUNTER
No future appt scheduled.  Letter mailed to pt on 2-7-18 to schedule appt.    Routed to Leeanne Robin NP re: refill requests.    Please advise, thanks.

## 2018-03-21 RX ORDER — SILDENAFIL 100 MG/1
TABLET, FILM COATED ORAL
Qty: 18 TABLET | Refills: 0 | Status: SHIPPED | OUTPATIENT
Start: 2018-03-21 | End: 2018-06-14

## 2018-03-21 NOTE — TELEPHONE ENCOUNTER
Pt has not called back to schedule an appt.   Routing refill request to provider for review/approval because:  Patient needs to be seen because:  Due for diabetes appt.

## 2018-06-14 ENCOUNTER — OFFICE VISIT (OUTPATIENT)
Dept: INTERNAL MEDICINE | Facility: CLINIC | Age: 48
End: 2018-06-14
Payer: COMMERCIAL

## 2018-06-14 VITALS
BODY MASS INDEX: 35.61 KG/M2 | OXYGEN SATURATION: 95 % | RESPIRATION RATE: 18 BRPM | SYSTOLIC BLOOD PRESSURE: 120 MMHG | TEMPERATURE: 98.3 F | WEIGHT: 235 LBS | DIASTOLIC BLOOD PRESSURE: 80 MMHG | HEART RATE: 80 BPM | HEIGHT: 68 IN

## 2018-06-14 DIAGNOSIS — N52.1 ERECTILE DYSFUNCTION DUE TO DISEASES CLASSIFIED ELSEWHERE: ICD-10-CM

## 2018-06-14 DIAGNOSIS — I10 BENIGN ESSENTIAL HYPERTENSION: ICD-10-CM

## 2018-06-14 DIAGNOSIS — E78.5 HYPERLIPIDEMIA WITH TARGET LDL LESS THAN 100: ICD-10-CM

## 2018-06-14 DIAGNOSIS — E11.9 TYPE 2 DIABETES MELLITUS WITHOUT COMPLICATION, WITHOUT LONG-TERM CURRENT USE OF INSULIN (H): ICD-10-CM

## 2018-06-14 DIAGNOSIS — E11.00 TYPE 2 DIABETES MELLITUS WITH HYPEROSMOLARITY WITHOUT COMA, WITHOUT LONG-TERM CURRENT USE OF INSULIN (H): ICD-10-CM

## 2018-06-14 PROCEDURE — 99214 OFFICE O/P EST MOD 30 MIN: CPT | Performed by: PHYSICIAN ASSISTANT

## 2018-06-14 RX ORDER — LISINOPRIL 2.5 MG/1
2.5 TABLET ORAL DAILY
Qty: 90 TABLET | Refills: 0 | Status: SHIPPED | OUTPATIENT
Start: 2018-06-14 | End: 2018-08-15

## 2018-06-14 RX ORDER — ATORVASTATIN CALCIUM 10 MG/1
10 TABLET, FILM COATED ORAL DAILY
Qty: 90 TABLET | Refills: 0 | Status: SHIPPED | OUTPATIENT
Start: 2018-06-14 | End: 2018-08-15

## 2018-06-14 RX ORDER — LANCETS 28 GAUGE
EACH MISCELLANEOUS
Qty: 300 EACH | Refills: 0 | Status: SHIPPED | OUTPATIENT
Start: 2018-06-14

## 2018-06-14 RX ORDER — SILDENAFIL 100 MG/1
TABLET, FILM COATED ORAL
Qty: 18 TABLET | Refills: 0 | Status: SHIPPED | OUTPATIENT
Start: 2018-06-14 | End: 2018-08-15

## 2018-06-14 NOTE — NURSING NOTE
"Chief Complaint   Patient presents with     Recheck Medication     initial /80  Pulse 80  Temp 98.3  F (36.8  C) (Oral)  Resp 18  Ht 5' 8\" (1.727 m)  Wt 235 lb (106.6 kg)  SpO2 95%  BMI 35.73 kg/m2 Estimated body mass index is 35.73 kg/(m^2) as calculated from the following:    Height as of this encounter: 5' 8\" (1.727 m).    Weight as of this encounter: 235 lb (106.6 kg)..  bp completed using cuff size large  QUYEN GARRISON LPN  "

## 2018-06-14 NOTE — MR AVS SNAPSHOT
After Visit Summary   6/14/2018    Riya Butt    MRN: [id number]           Patient Information     Date Of Birth          1970        Visit Information        Provider Department      6/14/2018 2:00 PM Lynsey Sheriff PA-C [facility]        Today's Diagnoses     Hyperlipidemia with target LDL less than 100        Type 2 diabetes mellitus without complication, without long-term current use of insulin (H)        Type 2 diabetes mellitus with hyperosmolarity without coma, without long-term current use of insulin (H)        Benign essential hypertension        Erectile dysfunction due to diseases classified elsewhere          Care Instructions    I will refill your medications today and have you follow up for fasting lab work tomorrow. You will need to follow up for recheck of your diabetes in 3-6 months depending on control.               Follow-ups after your visit        Follow-up notes from your care team     Return for Lab Work.      Future tests that were ordered for you today     Open Future Orders        Priority Expected Expires Ordered    Lipid panel reflex to direct LDL Fasting Routine 12/11/2018 1/10/2019 6/14/2018    **Comprehensive metabolic panel FUTURE 6mo Routine 12/11/2018 1/10/2019 6/14/2018    Hemoglobin A1c Routine  6/14/2019 6/14/2018    Albumin Random Urine Quantitative with Creat Ratio Routine 12/11/2018 1/10/2019 6/14/2018    **TSH with free T4 reflex FUTURE 6mo Routine 12/11/2018 1/10/2019 6/14/2018    PSA, screen Routine  6/14/2019 6/14/2018            Who to contact     If you have questions or need follow up information about today's clinic visit or your schedule please contact [facility] directly at [phone].  Normal or non-critical lab and imaging results will be communicated to you by MyChart, letter or phone within 4 business days after the clinic has received the results. If you do not hear from us within 7 days, please  "contact the clinic through [employer] or phone. If you have a critical or abnormal lab result, we will notify you by phone as soon as possible.  Submit refill requests through [employer] or call your pharmacy and they will forward the refill request to us. Please allow 3 business days for your refill to be completed.          Additional Information About Your Visit        Care EveryWhere ID     This is your Care EveryWhere ID. This could be used by other organizations to access your [facility] medical records  [id number]        Your Vitals Were     Pulse Temperature Respirations Height Pulse Oximetry BMI (Body Mass Index)    80 98.3  F (36.8  C) (Oral) 18 5' 8\" (1.727 m) 95% 35.73 kg/m2       Blood Pressure from Last 3 Encounters:   06/14/18 120/80   04/25/17 128/78   04/14/16 110/84    Weight from Last 3 Encounters:   06/14/18 235 lb (106.6 kg)   04/25/17 225 lb (102.1 kg)   04/14/16 233 lb 14.4 oz (106.1 kg)                 Today's Medication Changes          These changes are accurate as of 6/14/18  2:22 PM.  If you have any questions, ask your nurse or doctor.               These medicines have changed or have updated prescriptions.        Dose/Directions    atorvastatin 10 MG tablet   Commonly known as:  LIPITOR   This may have changed:  See the new instructions.   Used for:  Hyperlipidemia with target LDL less than 100   Changed by:  Lynsey Sheriff PA-C        Dose:  10 mg   Take 1 tablet (10 mg) by mouth daily   Quantity:  90 tablet   Refills:  0       sitagliptin 100 MG tablet   Commonly known as:  JANUVIA   This may have changed:  See the new instructions.   Used for:  Type 2 diabetes mellitus with hyperosmolarity without coma, without long-term current use of insulin (H)   Changed by:  Lynsey Sheriff PA-C        Dose:  100 mg   Take 1 tablet (100 mg) by mouth daily   Quantity:  90 tablet   Refills:  0            Where to get your medicines      These medications were sent to [facility] - [address] [address]     Phone:  [phone]     atorvastatin 10 MG tablet    blood glucose monitoring lancets    blood glucose monitoring test strip    insulin pen needle 31G X 6 MM    lisinopril 2.5 MG tablet    metFORMIN 1000 MG tablet    sildenafil 100 MG tablet    sitagliptin 100 MG tablet                Primary Care Provider Office Phone # Fax #    Whitley Bush [phone]       [address] [facility] [address]        Equal Access to Services     [facility]: Hadii aad ku hadasho Soomaali, waaxda luqadaha, qaybta kaalmada adeegyada, waxay idiin hayaan adeeg kharaelisa castillo . So [facility] [phone].    ATENCIÓN: Si habla español, tiene a portillo disposición servicios gratuitos de asistencia lingüística. [facility] [phone].    We comply with applicable federal civil rights laws and [place] laws. We do not discriminate on the basis of race, color, national origin, age, disability, sex, sexual orientation, or gender identity.            Thank you!     Thank you for choosing [facility]  for your care. Our goal is always to provide you with excellent care. Hearing back from our patients is one way we can continue to improve our services. Please take a few minutes to complete the written survey that you may receive in the mail after your visit with us. Thank you!             Your Updated Medication List - Protect others around you: Learn how to safely use, store and throw away your medicines at www.disposemymeds.org.          This list is accurate as of 6/14/18  2:22 PM.  Always use your most recent med list.                   Brand Name Dispense Instructions for use Diagnosis    atorvastatin 10 MG tablet    LIPITOR    90 tablet    Take 1 tablet (10 mg) by mouth daily    Hyperlipidemia with target LDL less than 100       blood glucose monitoring lancets     300 each    Use to test blood sugars 3  times daily or as directed.    Type 2 diabetes mellitus without complication, without long-term current use of insulin (H)       blood glucose monitoring test strip    FREESTYLE LITE    300 each    Use to test blood sugars 3 times daily or as directed.    Type 2 diabetes mellitus without complication, without long-term current use of insulin (H)       insulin pen needle 31G X 6 MM     100 each    Use  daily or as directed.    Type 2 diabetes mellitus without complication, without long-term current use of insulin (H)       lisinopril 2.5 MG tablet    PRINIVIL/Zestril    90 tablet    Take 1 tablet (2.5 mg) by mouth daily    Benign essential hypertension       metFORMIN 1000 MG tablet    GLUCOPHAGE    180 tablet    Take 1 tablet (1,000 mg) by mouth 2 times daily (with meals)    Type 2 diabetes mellitus with hyperosmolarity without coma, without long-term current use of insulin (H)       order for DME     3 Month    Equipment being ordered: All diabetic testing supplies including test strips, lancets and solution for testing 3 times per day.    Type 2 diabetes, HbA1C goal < 8% (H)       pioglitazone 30 MG tablet    ACTOS    90 tablet    TAKE 1 TABLET DAILY    Type 2 diabetes mellitus without complication, without long-term current use of insulin (H)       sildenafil 100 MG tablet    VIAGRA    18 tablet    TAKE 1 TABLET DAILY AS NEEDED    Erectile dysfunction due to diseases classified elsewhere       sitagliptin 100 MG tablet    JANUVIA    90 tablet    Take 1 tablet (100 mg) by mouth daily    Type 2 diabetes mellitus with hyperosmolarity without coma, without long-term current use of insulin (H)

## 2018-06-14 NOTE — PROGRESS NOTES
.  SUBJECTIVE:   Riya Butt is a 48 year old male who presents to clinic today for the following health issues:      Diabetes Follow-up    Patient is checking blood sugars: once daily.  Results are as follows:         am - 119    Diabetic concerns: None     Symptoms of hypoglycemia (low blood sugar): none     Paresthesias (numbness or burning in feet) or sores: No     Date of last diabetic eye exam: x 2wweks ago    Hyperlipidemia Follow-Up      Rate your low fat/cholesterol diet?: good    Taking statin?  Yes, no muscle aches from statin    Other lipid medications/supplements?:  none    Hypertension Follow-up      Outpatient blood pressures are not being checked.    Low Salt Diet: low salt    BP Readings from Last 2 Encounters:   06/14/18 120/80   04/25/17 128/78     Hemoglobin A1C (%)   Date Value   04/25/2017 10.1 (H)   04/14/2016 10.8 (H)     LDL Cholesterol Calculated (mg/dL)   Date Value   04/25/2017 153 (H)   04/14/2016 135 (H)       Amount of exercise or physical activity: lift weights    Problems taking medications regularly: No    Medication side effects: none    Diet: low salt and low fat/cholesterol    Patient is here for follow up on his diabetes and refills of his medication. He has not had a diabetic follow up for over a year. He reports that he just had his yearly eye exam and it was normal. He is not fasting today.    -------------------------------------    Problem list and histories reviewed & adjusted, as indicated.  Additional history: as documented    BP Readings from Last 3 Encounters:   06/14/18 120/80   04/25/17 128/78   04/14/16 110/84    Wt Readings from Last 3 Encounters:   06/14/18 235 lb (106.6 kg)   04/25/17 225 lb (102.1 kg)   04/14/16 233 lb 14.4 oz (106.1 kg)         Reviewed and updated as needed this visit by clinical staff  Tobacco  Med Hx  Surg Hx  Fam Hx  Soc Hx      Reviewed and updated as needed this visit by Provider         ROS:  Constitutional, HEENT, cardiovascular,  "pulmonary, gi and gu systems are negative, except as otherwise noted.    OBJECTIVE:     /80  Pulse 80  Temp 98.3  F (36.8  C) (Oral)  Resp 18  Ht 5' 8\" (1.727 m)  Wt 235 lb (106.6 kg)  SpO2 95%  BMI 35.73 kg/m2  Body mass index is 35.73 kg/(m^2).  GENERAL: healthy, alert and no distress  EYES: Eyes grossly normal to inspection, PERRL and conjunctivae and sclerae normal  NECK: no adenopathy, no asymmetry, masses, or scars and thyroid normal to palpation  RESP: lungs clear to auscultation - no rales, rhonchi or wheezes  CV: regular rate and rhythm, normal S1 S2, no S3 or S4, no murmur, click or rub, no peripheral edema and peripheral pulses strong  ABDOMEN: soft, nontender, no hepatosplenomegaly, no masses and bowel sounds normal  MS: no gross musculoskeletal defects noted, no edema  SKIN: no suspicious lesions or rashes  NEURO: Normal strength and tone, sensory exam grossly normal and mentation intact  LYMPH: no cervical adenopathy    Diagnostic Test Results:  Labs ordered and will be drawn fasting tomorrow.    ASSESSMENT/PLAN:     Diabetes Type II, A1c Uncontrolled, non-insulin dependent   Associated with the following complications    Renal Complications:  None    Ophthalmologic Complications: None    Neurologic Complications: None    Peripheral Vascular Complications:  None    Other: None   Plan:  No changes in the patient's current treatment plan  Labs:  Microalbumin, A1C, Lipids, TSH and CMP        ICD-10-CM    1. Hyperlipidemia with target LDL less than 100 E78.5 atorvastatin (LIPITOR) 10 MG tablet     Lipid panel reflex to direct LDL Fasting     **Comprehensive metabolic panel FUTURE 6mo   2. Type 2 diabetes mellitus without complication, without long-term current use of insulin (H) E11.9 blood glucose monitoring (FREESTYLE LITE) test strip     blood glucose monitoring (FREESTYLE) lancets     insulin pen needle 31G X 6 MM     Lipid panel reflex to direct LDL Fasting     **Comprehensive metabolic " panel FUTURE 6mo     Hemoglobin A1c     Albumin Random Urine Quantitative with Creat Ratio     **TSH with free T4 reflex FUTURE 6mo   3. Type 2 diabetes mellitus with hyperosmolarity without coma, without long-term current use of insulin (H) E11.00 sitagliptin (JANUVIA) 100 MG tablet     metFORMIN (GLUCOPHAGE) 1000 MG tablet   4. Benign essential hypertension I10 lisinopril (PRINIVIL/ZESTRIL) 2.5 MG tablet     **Comprehensive metabolic panel FUTURE 6mo   5. Erectile dysfunction due to diseases classified elsewhere N52.1 sildenafil (VIAGRA) 100 MG tablet     PSA, screen       I will have patient follow up for fasting blood work tomorrow and will give further refills if indicated by A1C. Patient will follow up with PCP in 3-6 months depending on lab results.   See Patient Instructions    Lynsey Sheriff PA-C  [facility]

## 2018-06-14 NOTE — PATIENT INSTRUCTIONS
I will refill your medications today and have you follow up for fasting lab work tomorrow. You will need to follow up for recheck of your diabetes in 3-6 months depending on control.

## 2018-08-15 DIAGNOSIS — I10 BENIGN ESSENTIAL HYPERTENSION: ICD-10-CM

## 2018-08-15 DIAGNOSIS — E78.5 HYPERLIPIDEMIA WITH TARGET LDL LESS THAN 100: ICD-10-CM

## 2018-08-15 DIAGNOSIS — E11.00 TYPE 2 DIABETES MELLITUS WITH HYPEROSMOLARITY WITHOUT COMA, WITHOUT LONG-TERM CURRENT USE OF INSULIN (H): ICD-10-CM

## 2018-08-15 DIAGNOSIS — N52.1 ERECTILE DYSFUNCTION DUE TO DISEASES CLASSIFIED ELSEWHERE: ICD-10-CM

## 2018-08-16 NOTE — TELEPHONE ENCOUNTER
"Requested Prescriptions   Pending Prescriptions Disp Refills     metFORMIN (GLUCOPHAGE) 1000 MG tablet [Pharmacy Med Name: METFORMIN HCL TABS 1000MG] 180 tablet 0    Last Written Prescription Date:  06/144/2018 #180  x0  Last filled 06/18/2018  Last office visit: 6/14/2018 KAVITA Escalera   Future Office Visit:  None   Sig: TAKE 1 TABLET TWICE A DAY WITH MEALS    Biguanide Agents Failed    8/15/2018 12:05 PM       Failed - Patient has documented LDL within the past 12 mos.    Recent Labs   Lab Test  04/25/17   1639   LDL  153*            Failed - Patient has had a Microalbumin in the past 12 mos.    Recent Labs   Lab Test  04/25/17   1640   MICROL  12   UMALCR  12.60            Failed - Patient has documented A1c within the specified period of time.    If HgbA1C is 8 or greater, it needs to be on file within the past 3 months.  If less than 8, must be on file within the past 6 months.     Recent Labs   Lab Test  04/25/17   1639   A1C  10.1*            Passed - Blood pressure less than 140/90 in past 6 months    BP Readings from Last 3 Encounters:   06/14/18 120/80   04/25/17 128/78   04/14/16 110/84                Passed - Patient is age 10 or older       Passed - Patient's CR is NOT>1.4 OR Patient's EGFR is NOT<45 within past 12 mos.    Recent Labs   Lab Test  04/25/17   1639   GFRESTIMATED  >90  Non  GFR Calc     GFRESTBLACK  >90   GFR Calc         Recent Labs   Lab Test  04/25/17   1639   CR  0.69            Passed - Patient does NOT have a diagnosis of CHF.       Passed - Recent (6 mo) or future (30 days) visit within the authorizing provider's specialty    Patient had office visit in the last 6 months or has a visit in the next 30 days with authorizing provider or within the authorizing provider's specialty.  See \"Patient Info\" tab in inbasket, or \"Choose Columns\" in Meds & Orders section of the refill encounter.            JANUVIA 100 MG tablet [Pharmacy Med Name: JANUVIA TABS " "100MG] 90 tablet 0    Last Written Prescription Date:  06/14/2018 #90 x 0  Last filled 06/18/2018  Last office visit: 6/14/2018 KAVITA Escalera   Future Office Visit:  None   Sig: TAKE 1 TABLET DAILY    DPP4 Inhibitors Protocol Failed    8/15/2018 12:05 PM       Failed - LDL on file in past 12 months    Recent Labs   Lab Test  04/25/17   1639   LDL  153*            Failed - Microalbumin on file in past 12 months    Recent Labs   Lab Test  04/25/17   1640   MICROL  12   UMALCR  12.60            Failed - HgbA1C in past 3 or 6 months    If HgbA1C is 8 or greater, it needs to be on file within the past 3 months.  If less than 8, must be on file within the past 6 months.     Recent Labs   Lab Test  04/25/17   1639   A1C  10.1*            Failed - Normal serum creatinine in past 12 months    Recent Labs   Lab Test  04/25/17   1639   CR  0.69            Passed - Blood pressure less than 140/90 in past 6 months    BP Readings from Last 3 Encounters:   06/14/18 120/80   04/25/17 128/78   04/14/16 110/84                Passed - Patient is age 18 or older       Passed - Recent (6 mo) or future (30 days) visit within the authorizing provider's specialty    Patient had office visit in the last 6 months or has a visit in the next 30 days with authorizing provider.  See \"Patient Info\" tab in inbasket, or \"Choose Columns\" in Meds & Orders section of the refill encounter.            lisinopril (PRINIVIL/ZESTRIL) 2.5 MG tablet [Pharmacy Med Name: LISINOPRIL TABS 2.5MG] 90 tablet 0    Last Written Prescription Date:  06/14/2018 #90 x 0  Last filled 06/18/2018  Last office visit: 6/14/2018 KAVITA Escalera   Future Office Visit:  None   Sig: TAKE 1 TABLET DAILY    ACE Inhibitors (Including Combos) Protocol Failed    8/15/2018 12:05 PM       Failed - Normal serum creatinine on file in past 12 months    Recent Labs   Lab Test  04/25/17   1639   CR  0.69            Failed - Normal serum potassium on file in past 12 months    Recent Labs   Lab " "Test  04/25/17   1639   POTASSIUM  4.1            Passed - Blood pressure under 140/90 in past 12 months    BP Readings from Last 3 Encounters:   06/14/18 120/80   04/25/17 128/78   04/14/16 110/84                Passed - Recent (12 mo) or future (30 days) visit within the authorizing provider's specialty    Patient had office visit in the last 12 months or has a visit in the next 30 days with authorizing provider or within the authorizing provider's specialty.  See \"Patient Info\" tab in inbasket, or \"Choose Columns\" in Meds & Orders section of the refill encounter.           Passed - Patient is age 18 or older        atorvastatin (LIPITOR) 10 MG tablet [Pharmacy Med Name: ATORVASTATIN TABS 10MG] 90 tablet 0    Last Written Prescription Date:  06/14/2018 #90 x 0  Last filled 06/18/2018  Last office visit: 6/14/2018 KAVITA Escalera   Future Office Visit:  None   Sig: TAKE 1 TABLET DAILY    Statins Protocol Failed    8/15/2018 12:05 PM       Failed - LDL on file in past 12 months    Recent Labs   Lab Test  04/25/17   1639   LDL  153*            Passed - No abnormal creatine kinase in past 12 months    No lab results found.            Passed - Recent (12 mo) or future (30 days) visit within the authorizing provider's specialty    Patient had office visit in the last 12 months or has a visit in the next 30 days with authorizing provider or within the authorizing provider's specialty.  See \"Patient Info\" tab in inbasket, or \"Choose Columns\" in Meds & Orders section of the refill encounter.           Passed - Patient is age 18 or older        sildenafil (VIAGRA) 100 MG tablet [Pharmacy Med Name: SILDENAFIL TABS 100MG] 18 tablet 0    Last Written Prescription Date:  06/14/2018 #18 x 0  Last filled 06/18/2018  Last office visit: 6/14/2018 KAVITA Escalera   Future Office Visit:  None   Sig: TAKE 1 TABLET DAILY AS NEEDED    Erectile Dysfuction Protocol Passed    8/15/2018 12:05 PM       Passed - Absence of nitrates on medication " "list       Passed - Absence of Alpha Blockers on Med list       Passed - Recent (12 mo) or future (30 days) visit within the authorizing provider's specialty    Patient had office visit in the last 12 months or has a visit in the next 30 days with authorizing provider or within the authorizing provider's specialty.  See \"Patient Info\" tab in inbasket, or \"Choose Columns\" in Meds & Orders section of the refill encounter.           Passed - Patient is age 18 or older          "

## 2018-08-17 RX ORDER — SILDENAFIL 100 MG/1
TABLET, FILM COATED ORAL
Qty: 18 TABLET | Refills: 0 | Status: SHIPPED | OUTPATIENT
Start: 2018-08-17 | End: 2018-12-18

## 2018-08-17 RX ORDER — SITAGLIPTIN 100 MG/1
TABLET, FILM COATED ORAL
Qty: 90 TABLET | Refills: 0 | Status: SHIPPED | OUTPATIENT
Start: 2018-08-17 | End: 2018-12-18

## 2018-08-17 RX ORDER — ATORVASTATIN CALCIUM 10 MG/1
TABLET, FILM COATED ORAL
Qty: 90 TABLET | Refills: 0 | Status: SHIPPED | OUTPATIENT
Start: 2018-08-17 | End: 2018-12-18

## 2018-08-17 RX ORDER — LISINOPRIL 2.5 MG/1
TABLET ORAL
Qty: 90 TABLET | Refills: 0 | Status: SHIPPED | OUTPATIENT
Start: 2018-08-17 | End: 2018-12-18

## 2018-08-17 NOTE — TELEPHONE ENCOUNTER
Routing refill request to provider for review/approval because:  Labs out of range:  LDL  Seen on 6/14 and advised to have labs draw, still not done.

## 2018-12-18 DIAGNOSIS — E11.9 TYPE 2 DIABETES MELLITUS WITHOUT COMPLICATION, WITHOUT LONG-TERM CURRENT USE OF INSULIN (H): ICD-10-CM

## 2018-12-18 NOTE — TELEPHONE ENCOUNTER
"Requested Prescriptions   Pending Prescriptions Disp Refills     pioglitazone (ACTOS) 30 MG tablet [Pharmacy Med Name: PIOGLITAZONE TABS 30MG]  Last Written Prescription Date:  9/6/2018  Last Fill Quantity: 30,  # refills: 0   Last office visit: 6/14/2018 with prescribing provider:     Future Office Visit:   30 tablet 0     Sig: TAKE 1 TABLET DAILY (OVERDUE FOR LABS, OFFICE VISIT)    Thiazolidinedione Agents (TZDs)  Failed - 12/18/2018  4:15 AM       Failed - Blood pressure less than 140/90 in past 6 months    BP Readings from Last 3 Encounters:   06/14/18 120/80   04/25/17 128/78   04/14/16 110/84                Failed - Patient has documented LDL within the past 12 mos.    Recent Labs   Lab Test 04/25/17  1639   *            Failed - Patient has a normal ALT within the past 12 mos.    Recent Labs   Lab Test 04/25/17  1639   ALT 41            Failed - Patient has a normal AST within the past 12 mos.     Recent Labs   Lab Test 08/23/14  1008   AST 17            Failed - Patient has had a Microalbumin in the past 12 mos.    Recent Labs   Lab Test 04/25/17  1640   MICROL 12   UMALCR 12.60            Failed - Patient has documented A1c within the specified period of time.    If HgbA1C is 8 or greater, it needs to be on file within the past 3 months.  If less than 8, must be on file within the past 6 months.     Recent Labs   Lab Test 04/25/17  1639   A1C 10.1*            Failed - Patient has a normal serum Creatinine in the past 12 months    Recent Labs   Lab Test 04/25/17  1639   CR 0.69            Failed - Recent (6 mo) or future (30 days) visit within the authorizing provider's specialty    Patient had office visit in the last 6 months or has a visit in the next 30 days with authorizing provider or within the authorizing provider's specialty.  See \"Patient Info\" tab in inbasket, or \"Choose Columns\" in Meds & Orders section of the refill encounter.           Passed - Diagnosis not CHF       Passed - Patient is " age 18 or older

## 2018-12-19 ENCOUNTER — TELEPHONE (OUTPATIENT)
Dept: INTERNAL MEDICINE | Facility: CLINIC | Age: 48
End: 2018-12-19

## 2018-12-19 NOTE — TELEPHONE ENCOUNTER
Panel Management Review      Patient has the following on his problem list:     Diabetes    ASA: Failed    Last A1C  Lab Results   Component Value Date    A1C 10.1 04/25/2017    A1C 10.8 04/14/2016    A1C 9.1 03/20/2015    A1C 10.0 08/23/2014    A1C 7.3 07/08/2013     A1C tested: FAILED    Last LDL:    Lab Results   Component Value Date    CHOL 210 04/25/2017     Lab Results   Component Value Date    HDL 37 04/25/2017     Lab Results   Component Value Date     04/25/2017     Lab Results   Component Value Date    TRIG 102 04/25/2017     Lab Results   Component Value Date    CHOLHDLRATIO 4.3 08/23/2014     Lab Results   Component Value Date    NHDL 173 04/25/2017       Is the patient on a Statin? YES             Is the patient on Aspirin? NO    Medications     HMG CoA Reductase Inhibitors    atorvastatin (LIPITOR) 10 MG tablet          Last three blood pressure readings:  BP Readings from Last 3 Encounters:   06/14/18 120/80   04/25/17 128/78   04/14/16 110/84       Date of last diabetes office visit: 06/14/18     Tobacco History:     History   Smoking Status     Former Smoker     Packs/day: 0.50     Years: 12.00   Smokeless Tobacco     Never Used     Comment: Quit at age 31         Hypertension   Last three blood pressure readings:  BP Readings from Last 3 Encounters:   06/14/18 120/80   04/25/17 128/78   04/14/16 110/84     Blood pressure: Passed    HTN Guidelines:  Age 18-59 BP range:  Less than 140/90  Age 60-85 with Diabetes:  Less than 140/90  Age 60-85 without Diabetes:  less than 150/90      Composite cancer screening  Chart review shows that this patient is due/due soon for the following None  Summary:    Patient is due/failing the following:   A1C    Action needed:   Patient needs office visit for Diabetes check.    Type of outreach:    Sent letter.    Questions for provider review:    None                                                                                                                                     RAVI JOHN,[facility]       Chart routed to Care Team .

## 2018-12-19 NOTE — LETTER
[facility]  [address], [address]  [place], [place]  [address]                                            TEL:[phone]  FAX:[phone]      Riya Butt  [address] [facility] [address]      December 19, 2018    Dear Riya,    At [facility] we care about your health and well-being.  A review of your chart has indicated that you are due for a Diabetes check.  Please contact us at [phone] to schedule an appointment.    If you have already had one or all of the above screening tests at another facility, please call us to update your chart.     Sincerely,    Leeanne RobinCNP

## 2018-12-19 NOTE — LETTER
[facility]  [address], [address]  [place], [place]  [address]                                            TEL:[phone]  FAX:[phone]      Riya Butt  [address] [address]      January 3, 2019    Dear Riya,    We sent you a letter a couple of weeks ago informing you of health maintenance that is due. We hope that you received it. This letter is just a follow up to remind you to schedule an appointment.            Sincerely,      Leeanne Robin C.N.P.

## 2018-12-20 RX ORDER — PIOGLITAZONEHYDROCHLORIDE 30 MG/1
TABLET ORAL
Qty: 30 TABLET | Refills: 0 | OUTPATIENT
Start: 2018-12-20

## 2018-12-20 NOTE — TELEPHONE ENCOUNTER
Routing refill request to provider for review/approval because:  Patient is over due for diabetic labs.  He was seen in June and was advised to have labs then, but they have not been done.  Nadeen Marshall RN

## 2018-12-21 RX ORDER — PIOGLITAZONEHYDROCHLORIDE 30 MG/1
30 TABLET ORAL DAILY
Qty: 90 TABLET | Refills: 0 | Status: SHIPPED | OUTPATIENT
Start: 2018-12-21 | End: 2019-03-16

## 2018-12-21 NOTE — TELEPHONE ENCOUNTER
Refill refused by Leeanne as patient is due for appointment.     Contacted patient, he has appointment scheduled, but will need refill prior to appointment. Asked patient if he needs to use a local pharmacy with the holiday and he states [facility]night the medication to him.   Next 5 appointments (look out 90 days)    Dec 28, 2018  3:00 PM CST  SHORT with DENNISE Lin CNP  [facility] ([facility] [address]  [facility] [address] [phone]        Medication is being filled for 1 time refill only due to:  Future appointment scheduled

## 2019-03-16 DIAGNOSIS — N52.1 ERECTILE DYSFUNCTION DUE TO DISEASES CLASSIFIED ELSEWHERE: ICD-10-CM

## 2019-03-16 DIAGNOSIS — E78.5 HYPERLIPIDEMIA WITH TARGET LDL LESS THAN 100: ICD-10-CM

## 2019-03-16 DIAGNOSIS — I10 BENIGN ESSENTIAL HYPERTENSION: ICD-10-CM

## 2019-03-16 DIAGNOSIS — E11.00 TYPE 2 DIABETES MELLITUS WITH HYPEROSMOLARITY WITHOUT COMA, WITHOUT LONG-TERM CURRENT USE OF INSULIN (H): ICD-10-CM

## 2019-03-16 DIAGNOSIS — E11.9 TYPE 2 DIABETES MELLITUS WITHOUT COMPLICATION, WITHOUT LONG-TERM CURRENT USE OF INSULIN (H): ICD-10-CM

## 2019-03-18 NOTE — TELEPHONE ENCOUNTER
"Requested Prescriptions   Pending Prescriptions Disp Refills     atorvastatin (LIPITOR) 10 MG tablet [Pharmacy Med Name: ATORVASTATIN TABS 10MG] 90 tablet 0    Last Written Prescription Date:  12/20/2018  Last Fill Quantity: 90,  # refills: 0   Last office visit: 6/14/2018 with prescribing provider:     Future Office Visit:   Sig: TAKE 1 TABLET DAILY    Statins Protocol Failed - 3/16/2019  8:33 AM       Failed - LDL on file in past 12 months    Recent Labs   Lab Test 04/25/17  1639   *            Failed - Recent (12 mo) or future (30 days) visit within the authorizing provider's specialty    Patient had office visit in the last 12 months or has a visit in the next 30 days with authorizing provider or within the authorizing provider's specialty.  See \"Patient Info\" tab in inbasket, or \"Choose Columns\" in Meds & Orders section of the refill encounter.             Passed - No abnormal creatine kinase in past 12 months    No lab results found.            Passed - Medication is active on med list       Passed - Patient is age 18 or older        JANUVIA 100 MG tablet [Pharmacy Med Name: JANUVIA TABS 100MG] 90 tablet 0    Last Written Prescription Date:  12/20/2018  Last Fill Quantity: 90,  # refills: 0   Last office visit: 6/14/2018 with prescribing provider:     Future Office Visit:   Sig: TAKE 1 TABLET DAILY    DPP4 Inhibitors Protocol Failed - 3/16/2019  8:33 AM       Failed - Blood pressure less than 140/90 in past 6 months    BP Readings from Last 3 Encounters:   06/14/18 120/80   04/25/17 128/78   04/14/16 110/84                Failed - LDL on file in past 12 months    Recent Labs   Lab Test 04/25/17  1639   *            Failed - Microalbumin on file in past 12 months    Recent Labs   Lab Test 04/25/17  1640   MICROL 12   UMALCR 12.60            Failed - HgbA1C in past 3 or 6 months    If HgbA1C is 8 or greater, it needs to be on file within the past 3 months.  If less than 8, must be on file within " "the past 6 months.     Recent Labs   Lab Test 04/25/17  1639   A1C 10.1*            Failed - Normal serum creatinine in past 12 months    Recent Labs   Lab Test 04/25/17  1639   CR 0.69            Failed - Recent (6 mo) or future (30 days) visit within the authorizing provider's specialty    Patient had office visit in the last 6 months or has a visit in the next 30 days with authorizing provider.  See \"Patient Info\" tab in inbasket, or \"Choose Columns\" in Meds & Orders section of the refill encounter.           Passed - Medication is active on med list       Passed - Patient is age 18 or older        lisinopril (PRINIVIL/ZESTRIL) 2.5 MG tablet [Pharmacy Med Name: LISINOPRIL TABS 2.5MG] 90 tablet 0    Last Written Prescription Date:  12/20/2018  Last Fill Quantity: 90,  # refills: 0   Last office visit: 6/14/2018 with prescribing provider:     Future Office Visit:   Sig: TAKE 1 TABLET DAILY    ACE Inhibitors (Including Combos) Protocol Failed - 3/16/2019  8:33 AM       Failed - Recent (12 mo) or future (30 days) visit within the authorizing provider's specialty    Patient had office visit in the last 12 months or has a visit in the next 30 days with authorizing provider or within the authorizing provider's specialty.  See \"Patient Info\" tab in inbasket, or \"Choose Columns\" in Meds & Orders section of the refill encounter.             Failed - Normal serum creatinine on file in past 12 months    Recent Labs   Lab Test 04/25/17  1639   CR 0.69            Failed - Normal serum potassium on file in past 12 months    Recent Labs   Lab Test 04/25/17  1639   POTASSIUM 4.1            Passed - Blood pressure under 140/90 in past 12 months    BP Readings from Last 3 Encounters:   06/14/18 120/80   04/25/17 128/78   04/14/16 110/84                Passed - Medication is active on med list       Passed - Patient is age 18 or older        metFORMIN (GLUCOPHAGE) 1000 MG tablet [Pharmacy Med Name: METFORMIN HCL TABS 1000MG] 180 " "tablet 0    Last Written Prescription Date:  12/20/2018  Last Fill Quantity: 180,  # refills: 0   Last office visit: 6/14/2018 with prescribing provider:     Future Office Visit:   Sig: TAKE 1 TABLET TWICE A DAY WITH MEALS    Biguanide Agents Failed - 3/16/2019  8:33 AM       Failed - Blood pressure less than 140/90 in past 6 months    BP Readings from Last 3 Encounters:   06/14/18 120/80   04/25/17 128/78   04/14/16 110/84                Failed - Patient has documented LDL within the past 12 mos.    Recent Labs   Lab Test 04/25/17  1639   *            Failed - Patient has had a Microalbumin in the past 15 mos.    Recent Labs   Lab Test 04/25/17  1640   MICROL 12   UMALCR 12.60            Failed - Patient has documented A1c within the specified period of time.    If HgbA1C is 8 or greater, it needs to be on file within the past 3 months.  If less than 8, must be on file within the past 6 months.     Recent Labs   Lab Test 04/25/17  1639   A1C 10.1*            Failed - Recent (6 mo) or future (30 days) visit within the authorizing provider's specialty    Patient had office visit in the last 6 months or has a visit in the next 30 days with authorizing provider or within the authorizing provider's specialty.  See \"Patient Info\" tab in inbasket, or \"Choose Columns\" in Meds & Orders section of the refill encounter.           Passed - Patient is age 10 or older       Passed - Patient's CR is NOT>1.4 OR Patient's EGFR is NOT<45 within past 12 mos.    Recent Labs   Lab Test 04/25/17  1639   GFRESTIMATED >90  Non  GFR Calc     GFRESTBLACK >90   GFR Calc         Recent Labs   Lab Test 04/25/17  1639   CR 0.69            Passed - Patient does NOT have a diagnosis of CHF.       Passed - Medication is active on med list        pioglitazone (ACTOS) 30 MG tablet [Pharmacy Med Name: PIOGLITAZONE TABS 30MG] 90 tablet 0    Last Written Prescription Date:  12/21/2018  Last Fill Quantity: 90,  # " "refills: 0   Last office visit: 6/14/2018 with prescribing provider:     Future Office Visit:   Sig: TAKE 1 TABLET DAILY    Thiazolidinedione Agents (TZDs)  Failed - 3/16/2019  8:33 AM       Failed - Blood pressure less than 140/90 in past 6 months    BP Readings from Last 3 Encounters:   06/14/18 120/80   04/25/17 128/78   04/14/16 110/84                Failed - Patient has documented LDL within the past 12 mos.    Recent Labs   Lab Test 04/25/17  1639   *            Failed - Patient has a normal ALT within the past 12 mos.    Recent Labs   Lab Test 04/25/17  1639   ALT 41            Failed - Patient has a normal AST within the past 12 mos.     Recent Labs   Lab Test 08/23/14  1008   AST 17            Failed - Patient has had a Microalbumin in the past 12 mos.    Recent Labs   Lab Test 04/25/17  1640   MICROL 12   UMALCR 12.60            Failed - Patient has documented A1c within the specified period of time.    If HgbA1C is 8 or greater, it needs to be on file within the past 3 months.  If less than 8, must be on file within the past 6 months.     Recent Labs   Lab Test 04/25/17  1639   A1C 10.1*            Failed - Patient has a normal serum Creatinine in the past 12 months    Recent Labs   Lab Test 04/25/17  1639   CR 0.69            Failed - Recent (6 mo) or future (30 days) visit within the authorizing provider's specialty    Patient had office visit in the last 6 months or has a visit in the next 30 days with authorizing provider or within the authorizing provider's specialty.  See \"Patient Info\" tab in inbasket, or \"Choose Columns\" in Meds & Orders section of the refill encounter.           Passed - Diagnosis not CHF       Passed - Medication is active on med list       Passed - Patient is age 18 or older        sildenafil (VIAGRA) 100 MG tablet [Pharmacy Med Name: SILDENAFIL TABS 100MG] 18 tablet 0    Last Written Prescription Date:  12/20/2018  Last Fill Quantity: 18,  # refills: 0   Last office " "visit: 6/14/2018 with prescribing provider:     Future Office Visit:   Sig: TAKE 1 TABLET DAILY AS NEEDED    Erectile Dysfuction Protocol Failed - 3/16/2019  8:33 AM       Failed - Recent (12 mo) or future (30 days) visit within the authorizing provider's specialty    Patient had office visit in the last 12 months or has a visit in the next 30 days with authorizing provider or within the authorizing provider's specialty.  See \"Patient Info\" tab in inbasket, or \"Choose Columns\" in Meds & Orders section of the refill encounter.             Passed - Absence of nitrates on medication list       Passed - Absence of Alpha Blockers on Med list       Passed - Medication is active on med list       Passed - Patient is age 18 or older        "

## 2019-03-19 RX ORDER — SILDENAFIL 100 MG/1
TABLET, FILM COATED ORAL
Qty: 18 TABLET | Refills: 0 | Status: SHIPPED | OUTPATIENT
Start: 2019-03-19 | End: 2019-05-28

## 2019-03-19 RX ORDER — SITAGLIPTIN 100 MG/1
TABLET, FILM COATED ORAL
Qty: 90 TABLET | Refills: 0 | Status: SHIPPED | OUTPATIENT
Start: 2019-03-19 | End: 2019-05-28

## 2019-03-19 RX ORDER — LISINOPRIL 2.5 MG/1
TABLET ORAL
Qty: 90 TABLET | Refills: 0 | Status: SHIPPED | OUTPATIENT
Start: 2019-03-19 | End: 2019-05-28

## 2019-03-19 RX ORDER — PIOGLITAZONEHYDROCHLORIDE 30 MG/1
TABLET ORAL
Qty: 90 TABLET | Refills: 0 | Status: SHIPPED | OUTPATIENT
Start: 2019-03-19 | End: 2019-05-28

## 2019-03-19 RX ORDER — ATORVASTATIN CALCIUM 10 MG/1
TABLET, FILM COATED ORAL
Qty: 90 TABLET | Refills: 0 | Status: SHIPPED | OUTPATIENT
Start: 2019-03-19 | End: 2019-05-28

## 2019-03-19 NOTE — TELEPHONE ENCOUNTER
Patient was spoken to on 12/21. Patient was due for appointment and labs. Last office visit 6/14/18. Labs had been ordered at 6/14/18 office visit and were never done. Patient was given 90 day refill as he had appointment scheduled 12/28/18. Patient no showed for appointment.    Routing refill request to provider for review/approval because:  Shirley given x1 and patient did not follow up, please advise

## 2019-07-17 DIAGNOSIS — E78.5 HYPERLIPIDEMIA WITH TARGET LDL LESS THAN 100: ICD-10-CM

## 2019-07-17 DIAGNOSIS — I10 BENIGN ESSENTIAL HYPERTENSION: ICD-10-CM

## 2019-07-17 DIAGNOSIS — E11.00 TYPE 2 DIABETES MELLITUS WITH HYPEROSMOLARITY WITHOUT COMA, WITHOUT LONG-TERM CURRENT USE OF INSULIN (H): ICD-10-CM

## 2019-07-17 DIAGNOSIS — E11.9 TYPE 2 DIABETES MELLITUS WITHOUT COMPLICATION, WITHOUT LONG-TERM CURRENT USE OF INSULIN (H): ICD-10-CM

## 2019-07-17 NOTE — TELEPHONE ENCOUNTER
"Requested Prescriptions   Pending Prescriptions Disp Refills     pioglitazone (ACTOS) 30 MG tablet [Pharmacy Med Name: PIOGLITAZONE TABS 30MG] 30 tablet 0     Sig: TAKE 1 TABLET DAILY   Last Written Prescription Date:  05/29/2019  Last Fill Quantity: 30,  # refills: 0   Last office visit: 6/14/2018 with prescribing provider:     Future Office Visit:      Thiazolidinedione Agents (TZDs)  Failed - 7/17/2019  5:08 AM        Failed - Blood pressure less than 140/90 in past 6 months     BP Readings from Last 3 Encounters:   06/14/18 120/80   04/25/17 128/78   04/14/16 110/84                 Failed - Patient has documented LDL within the past 12 mos.     Recent Labs   Lab Test 04/25/17  1639   *             Failed - Patient has a normal ALT within the past 12 mos.     Recent Labs   Lab Test 04/25/17  1639   ALT 41             Failed - Patient has a normal AST within the past 12 mos.      Recent Labs   Lab Test 08/23/14  1008   AST 17             Failed - Patient has had a Microalbumin in the past 12 mos.     Recent Labs   Lab Test 04/25/17  1640   MICROL 12   UMALCR 12.60             Failed - Patient has documented A1c within the specified period of time.     If HgbA1C is 8 or greater, it needs to be on file within the past 3 months.  If less than 8, must be on file within the past 6 months.     Recent Labs   Lab Test 04/25/17  1639   A1C 10.1*             Failed - Patient has a normal serum Creatinine in the past 12 months     Recent Labs   Lab Test 04/25/17  1639   CR 0.69             Failed - Recent (6 mo) or future (30 days) visit within the authorizing provider's specialty     Patient had office visit in the last 6 months or has a visit in the next 30 days with authorizing provider or within the authorizing provider's specialty.  See \"Patient Info\" tab in inbasket, or \"Choose Columns\" in Meds & Orders section of the refill encounter.            Passed - Diagnosis not CHF        Passed - Medication is active on " "med list        Passed - Patient is age 18 or older        metFORMIN (GLUCOPHAGE) 1000 MG tablet [Pharmacy Med Name: METFORMIN HCL TABS 1000MG] 60 tablet 0     Sig: TAKE 1 TABLET TWICE A DAY WITH MEALS   Last Written Prescription Date:  05/29/2019  Last Fill Quantity: 60,  # refills: 0   Last office visit: 6/14/2018 with prescribing provider:     Future Office Visit:      Biguanide Agents Failed - 7/17/2019  5:08 AM        Failed - Blood pressure less than 140/90 in past 6 months     BP Readings from Last 3 Encounters:   06/14/18 120/80   04/25/17 128/78   04/14/16 110/84                 Failed - Patient has documented LDL within the past 12 mos.     Recent Labs   Lab Test 04/25/17  1639   *             Failed - Patient has had a Microalbumin in the past 15 mos.     Recent Labs   Lab Test 04/25/17  1640   MICROL 12   UMALCR 12.60             Failed - Patient has documented A1c within the specified period of time.     If HgbA1C is 8 or greater, it needs to be on file within the past 3 months.  If less than 8, must be on file within the past 6 months.     Recent Labs   Lab Test 04/25/17  1639   A1C 10.1*             Failed - Patient's CR is NOT>1.4 OR Patient's EGFR is NOT<45 within past 12 mos.     Recent Labs   Lab Test 04/25/17  1639   GFRESTIMATED >90  Non  GFR Calc     GFRESTBLACK >90   GFR Calc         Recent Labs   Lab Test 04/25/17  1639   CR 0.69             Failed - Recent (6 mo) or future (30 days) visit within the authorizing provider's specialty     Patient had office visit in the last 6 months or has a visit in the next 30 days with authorizing provider or within the authorizing provider's specialty.  See \"Patient Info\" tab in inbasket, or \"Choose Columns\" in Meds & Orders section of the refill encounter.            Passed - Patient is age 10 or older        Passed - Patient does NOT have a diagnosis of CHF.        Passed - Medication is active on med list        " "atorvastatin (LIPITOR) 10 MG tablet [Pharmacy Med Name: ATORVASTATIN TABS 10MG] 30 tablet 0     Sig: TAKE 1 TABLET DAILY   Last Written Prescription Date:  05/29/2019  Last Fill Quantity: 30,  # refills: 0   Last office visit: 6/14/2018 with prescribing provider:     Future Office Visit:      Statins Protocol Failed - 7/17/2019  5:08 AM        Failed - LDL on file in past 12 months     Recent Labs   Lab Test 04/25/17  1639   *             Failed - Recent (12 mo) or future (30 days) visit within the authorizing provider's specialty     Patient had office visit in the last 12 months or has a visit in the next 30 days with authorizing provider or within the authorizing provider's specialty.  See \"Patient Info\" tab in inbasket, or \"Choose Columns\" in Meds & Orders section of the refill encounter.              Passed - No abnormal creatine kinase in past 12 months     No lab results found.             Passed - Medication is active on med list        Passed - Patient is age 18 or older        lisinopril (PRINIVIL/ZESTRIL) 2.5 MG tablet [Pharmacy Med Name: LISINOPRIL TABS 2.5MG] 30 tablet 0     Sig: TAKE 1 TABLET DAILY (NO FURTHER REFILLS, OVERDUE FOR OFFICE VISIT)   Last Written Prescription Date:  05/29/2019  Last Fill Quantity: 30,  # refills: 0   Last office visit: 6/14/2018 with prescribing provider:     Future Office Visit:      ACE Inhibitors (Including Combos) Protocol Failed - 7/17/2019  5:08 AM        Failed - Blood pressure under 140/90 in past 12 months     BP Readings from Last 3 Encounters:   06/14/18 120/80   04/25/17 128/78   04/14/16 110/84                 Failed - Recent (12 mo) or future (30 days) visit within the authorizing provider's specialty     Patient had office visit in the last 12 months or has a visit in the next 30 days with authorizing provider or within the authorizing provider's specialty.  See \"Patient Info\" tab in inbasket, or \"Choose Columns\" in Meds & Orders section of the refill " encounter.              Failed - Normal serum creatinine on file in past 12 months     Recent Labs   Lab Test 04/25/17  1639   CR 0.69             Failed - Normal serum potassium on file in past 12 months     Recent Labs   Lab Test 04/25/17  1639   POTASSIUM 4.1             Passed - Medication is active on med list        Passed - Patient is age 18 or older

## 2019-07-18 RX ORDER — PIOGLITAZONEHYDROCHLORIDE 30 MG/1
TABLET ORAL
Qty: 30 TABLET | Refills: 0 | OUTPATIENT
Start: 2019-07-18

## 2019-07-18 RX ORDER — ATORVASTATIN CALCIUM 10 MG/1
10 TABLET, FILM COATED ORAL DAILY
Qty: 30 TABLET | Refills: 0 | Status: SHIPPED | OUTPATIENT
Start: 2019-07-18 | End: 2019-09-17

## 2019-07-18 RX ORDER — ATORVASTATIN CALCIUM 10 MG/1
TABLET, FILM COATED ORAL
Qty: 30 TABLET | Refills: 0 | OUTPATIENT
Start: 2019-07-18

## 2019-07-18 RX ORDER — LISINOPRIL 2.5 MG/1
TABLET ORAL
Qty: 30 TABLET | Refills: 0 | OUTPATIENT
Start: 2019-07-18

## 2019-07-18 RX ORDER — LISINOPRIL 2.5 MG/1
2.5 TABLET ORAL DAILY
Qty: 30 TABLET | Refills: 0 | Status: SHIPPED | OUTPATIENT
Start: 2019-07-18 | End: 2019-09-17

## 2019-07-18 NOTE — TELEPHONE ENCOUNTER
Patient scheduled OV for 07/31/19.  Patient states he will switch clinics if his medications are not filled because he does not have enough to last him until his appointment.

## 2019-07-18 NOTE — TELEPHONE ENCOUNTER
Last office visit 6/14/18 with Lynsey. Patient was advised labs and follow up in 3-6 months. Patient was given a 30 day refill of Actos 9/6/18 by primary care provider because due for labs. Message was left and letter was sent. Patient was given 90 day refill 12/20/18 for Lipitor, Lisinopril and Metformin. Refill for Actos denied 12/20 by primary care provider but then was approved for 90 days on 12/21 once patient was contacted and scheduled appointment for 12/28/18. Patient no showed for appointment. Panel management letter was sent x 2 in 12/2019. Patient was given additional 90 day refill 3/19/19 by primary care provider for Lipitor, Lisinopril, Metformin and Actos. Patient was given additional 30 day refill by PCP on 5/29/19 for Lisinopril,  Lipitor, Metformin and Actos. Last labs 4/2017.    Left detailed message requesting patient schedule appointment. OK to deny refills?

## 2019-09-17 DIAGNOSIS — E11.00 TYPE 2 DIABETES MELLITUS WITH HYPEROSMOLARITY WITHOUT COMA, WITHOUT LONG-TERM CURRENT USE OF INSULIN (H): ICD-10-CM

## 2019-09-17 DIAGNOSIS — I10 BENIGN ESSENTIAL HYPERTENSION: ICD-10-CM

## 2019-09-17 DIAGNOSIS — E78.5 HYPERLIPIDEMIA WITH TARGET LDL LESS THAN 100: ICD-10-CM

## 2019-09-17 DIAGNOSIS — N52.1 ERECTILE DYSFUNCTION DUE TO DISEASES CLASSIFIED ELSEWHERE: ICD-10-CM

## 2019-09-17 NOTE — TELEPHONE ENCOUNTER
"Requested Prescriptions   Pending Prescriptions Disp Refills     sildenafil (VIAGRA) 100 MG tablet [Pharmacy Med Name: SILDENAFIL TABS 100MG]    Last Written Prescription Date:  5/29/19  Last Fill Quantity: 18,  # refills: 0   Last office visit: 6/14/2018 with prescribing provider:  Sharita   Future Office Visit:     18 tablet 4     Sig: TAKE 1 TABLET DAILY AS NEEDED       Erectile Dysfuction Protocol Failed - 9/17/2019 10:40 AM        Failed - Recent (12 mo) or future (30 days) visit within the authorizing provider's specialty     Patient had office visit in the last 12 months or has a visit in the next 30 days with authorizing provider or within the authorizing provider's specialty.  See \"Patient Info\" tab in inbasket, or \"Choose Columns\" in Meds & Orders section of the refill encounter.              Passed - Absence of nitrates on medication list        Passed - Absence of Alpha Blockers on Med list        Passed - Medication is active on med list        Passed - Patient is age 18 or older        JANUVIA 100 MG tablet [Pharmacy Med Name: JANUVIA TABS 100MG]    Last Written Prescription Date:  7/18/19  Last Fill Quantity: 30,  # refills: 0   Last office visit: 6/14/2018 with prescribing provider:  Sharita   Future Office Visit:     30 tablet 12     Sig: TAKE 1 TABLET DAILY       DPP4 Inhibitors Protocol Failed - 9/17/2019 10:40 AM        Failed - Blood pressure less than 140/90 in past 6 months     BP Readings from Last 3 Encounters:   06/14/18 120/80   04/25/17 128/78   04/14/16 110/84                 Failed - LDL on file in past 12 months     Recent Labs   Lab Test 04/25/17  1639   *             Failed - Microalbumin on file in past 12 months     Recent Labs   Lab Test 04/25/17  1640   MICROL 12   UMALCR 12.60             Failed - HgbA1C in past 3 or 6 months     If HgbA1C is 8 or greater, it needs to be on file within the past 3 months.  If less than 8, must be on file within the past 6 months. " "    Recent Labs   Lab Test 04/25/17  1639   A1C 10.1*             Failed - Normal serum creatinine in past 12 months     Recent Labs   Lab Test 04/25/17  1639   CR 0.69             Failed - Recent (6 mo) or future (30 days) visit within the authorizing provider's specialty     Patient had office visit in the last 6 months or has a visit in the next 30 days with authorizing provider.  See \"Patient Info\" tab in inbasket, or \"Choose Columns\" in Meds & Orders section of the refill encounter.            Passed - Medication is active on med list        Passed - Patient is age 18 or older        atorvastatin (LIPITOR) 10 MG tablet [Pharmacy Med Name: ATORVASTATIN TABS 10MG]    Last Written Prescription Date:  7/18/19  Last Fill Quantity: 30,  # refills: 0   Last office visit: 6/14/2018 with prescribing provider:  Sharita   Future Office Visit:     30 tablet 12     Sig: TAKE 1 TABLET DAILY       Statins Protocol Failed - 9/17/2019 10:40 AM        Failed - LDL on file in past 12 months     Recent Labs   Lab Test 04/25/17  1639   *             Failed - Recent (12 mo) or future (30 days) visit within the authorizing provider's specialty     Patient had office visit in the last 12 months or has a visit in the next 30 days with authorizing provider or within the authorizing provider's specialty.  See \"Patient Info\" tab in inbasket, or \"Choose Columns\" in Meds & Orders section of the refill encounter.              Passed - No abnormal creatine kinase in past 12 months     No lab results found.             Passed - Medication is active on med list        Passed - Patient is age 18 or older        metFORMIN (GLUCOPHAGE) 1000 MG tablet [Pharmacy Med Name: METFORMIN HCL TABS 1000MG]    Last Written Prescription Date:  7/18/19  Last Fill Quantity: 60,  # refills: 0   Last office visit: 6/14/2018 with prescribing provider:  Sharita   Future Office Visit:     60 tablet 12     Sig: TAKE 1 TABLET TWICE A DAY WITH MEALS       " "Biguanide Agents Failed - 9/17/2019 10:40 AM        Failed - Blood pressure less than 140/90 in past 6 months     BP Readings from Last 3 Encounters:   06/14/18 120/80   04/25/17 128/78   04/14/16 110/84                 Failed - Patient has documented LDL within the past 12 mos.     Recent Labs   Lab Test 04/25/17  1639   *             Failed - Patient has had a Microalbumin in the past 15 mos.     Recent Labs   Lab Test 04/25/17  1640   MICROL 12   UMALCR 12.60             Failed - Patient has documented A1c within the specified period of time.     If HgbA1C is 8 or greater, it needs to be on file within the past 3 months.  If less than 8, must be on file within the past 6 months.     Recent Labs   Lab Test 04/25/17  1639   A1C 10.1*             Failed - Patient's CR is NOT>1.4 OR Patient's EGFR is NOT<45 within past 12 mos.     Recent Labs   Lab Test 04/25/17  1639   GFRESTIMATED >90  Non  GFR Calc     GFRESTBLACK >90   GFR Calc         Recent Labs   Lab Test 04/25/17  1639   CR 0.69             Failed - Recent (6 mo) or future (30 days) visit within the authorizing provider's specialty     Patient had office visit in the last 6 months or has a visit in the next 30 days with authorizing provider or within the authorizing provider's specialty.  See \"Patient Info\" tab in inbasket, or \"Choose Columns\" in Meds & Orders section of the refill encounter.            Passed - Patient is age 10 or older        Passed - Patient does NOT have a diagnosis of CHF.        Passed - Medication is active on med list        lisinopril (PRINIVIL/ZESTRIL) 2.5 MG tablet [Pharmacy Med Name: LISINOPRIL TABS 2.5MG]Last Written Prescription Date:  7/18/19  Last Fill Quantity: 30,  # refills: 0   Last office visit: 6/14/2018 with prescribing provider:  Sharita   Future Office Visit:     30 tablet 12     Sig: TAKE 1 TABLET DAILY       ACE Inhibitors (Including Combos) Protocol Failed - 9/17/2019 10:40 " "AM        Failed - Blood pressure under 140/90 in past 12 months     BP Readings from Last 3 Encounters:   06/14/18 120/80   04/25/17 128/78   04/14/16 110/84                 Failed - Recent (12 mo) or future (30 days) visit within the authorizing provider's specialty     Patient had office visit in the last 12 months or has a visit in the next 30 days with authorizing provider or within the authorizing provider's specialty.  See \"Patient Info\" tab in inbasket, or \"Choose Columns\" in Meds & Orders section of the refill encounter.              Failed - Normal serum creatinine on file in past 12 months     Recent Labs   Lab Test 04/25/17  1639   CR 0.69             Failed - Normal serum potassium on file in past 12 months     Recent Labs   Lab Test 04/25/17  1639   POTASSIUM 4.1             Passed - Medication is active on med list        Passed - Patient is age 18 or older          "

## 2019-09-18 RX ORDER — SITAGLIPTIN 100 MG/1
TABLET, FILM COATED ORAL
Qty: 30 TABLET | Refills: 12 | Status: SHIPPED | OUTPATIENT
Start: 2019-09-18

## 2019-09-18 RX ORDER — LISINOPRIL 2.5 MG/1
TABLET ORAL
Qty: 30 TABLET | Refills: 12 | Status: SHIPPED | OUTPATIENT
Start: 2019-09-18

## 2019-09-18 RX ORDER — SILDENAFIL 100 MG/1
TABLET, FILM COATED ORAL
Qty: 18 TABLET | Refills: 4 | Status: SHIPPED | OUTPATIENT
Start: 2019-09-18

## 2019-09-18 RX ORDER — ATORVASTATIN CALCIUM 10 MG/1
TABLET, FILM COATED ORAL
Qty: 30 TABLET | Refills: 12 | Status: SHIPPED | OUTPATIENT
Start: 2019-09-18

## 2019-09-18 NOTE — TELEPHONE ENCOUNTER
Routing refill request to provider for review/approval because:  Shirley given x2 and patient did not follow up, please advise.  Last office visit was 6/14/18.   On refill request on 7/17/19, patient threatened to switch clinics if prescriptions were not filled.  Patient then scheduled an office visit on 7/31/19 and was a No Show.  Patient also a No Show for office visit on 12/28/18.  Patient still has not made a follow up appointment.

## 2019-09-18 NOTE — TELEPHONE ENCOUNTER
Filled scripts.  Please call pharmacy to take off refills since this patient was not seen  Have pt schedule appt    thanks

## 2019-09-23 NOTE — TELEPHONE ENCOUNTER
"Call to [facility]. Unable to remove refills without sending new prescriptions. States prescriptions can be \"stopped\" so that no further refills can be sent but this can only be done after they ship or current shipment will also be stopped. Order is due to ship tomorrow 9/24. Will call tomorrow to put a stop on these prescriptions.    Left detailed message requesting patient call to schedule appointment.   "

## 2019-09-26 NOTE — TELEPHONE ENCOUNTER
Call to [facility]. Atorvastatin, Januvia, Lisinopril and Metformin were sent out to patient for 90 days even though they were only ordered for 30 days. States per insurance plan they are able to consolidate the prescription into a 90 day refills. Sildenafil was sent for 18 tablets. Stop put on these medications so no further refills will be sent. Call to patient. Left detailed message explaining this and that appointment will be needed for further refills.

## 2022-12-15 ENCOUNTER — HOSPITAL ENCOUNTER (EMERGENCY)
Facility: CLINIC | Age: 52
Discharge: HOME | End: 2022-12-15
Attending: EMERGENCY MEDICINE | Admitting: EMERGENCY MEDICINE
Payer: COMMERCIAL

## 2022-12-15 ENCOUNTER — APPOINTMENT (OUTPATIENT)
Dept: GENERAL RADIOLOGY | Facility: CLINIC | Age: 52
End: 2022-12-15
Attending: EMERGENCY MEDICINE
Payer: COMMERCIAL

## 2022-12-15 VITALS
DIASTOLIC BLOOD PRESSURE: 87 MMHG | OXYGEN SATURATION: 99 % | HEART RATE: 79 BPM | RESPIRATION RATE: 18 BRPM | TEMPERATURE: 97 F | SYSTOLIC BLOOD PRESSURE: 131 MMHG

## 2022-12-15 DIAGNOSIS — L03.031 CELLULITIS OF FOURTH TOE OF RIGHT FOOT: ICD-10-CM

## 2022-12-15 PROCEDURE — 99284 EMERGENCY DEPT VISIT MOD MDM: CPT

## 2022-12-15 PROCEDURE — 73660 X-RAY EXAM OF TOE(S): CPT | Mod: RT

## 2022-12-15 PROCEDURE — 250N000013 HC RX MED GY IP 250 OP 250 PS 637: Performed by: EMERGENCY MEDICINE

## 2022-12-15 RX ORDER — INDOMETHACIN 25 MG/1
50 CAPSULE ORAL ONCE
Status: COMPLETED | OUTPATIENT
Start: 2022-12-15 | End: 2022-12-15

## 2022-12-15 RX ORDER — INDOMETHACIN 50 MG/1
50 CAPSULE ORAL 3 TIMES DAILY PRN
Qty: 20 CAPSULE | Refills: 0 | Status: SHIPPED | OUTPATIENT
Start: 2022-12-15

## 2022-12-15 RX ORDER — CEPHALEXIN 500 MG/1
1000 CAPSULE ORAL ONCE
Status: COMPLETED | OUTPATIENT
Start: 2022-12-15 | End: 2022-12-15

## 2022-12-15 RX ORDER — CEPHALEXIN 500 MG/1
500 CAPSULE ORAL 4 TIMES DAILY
Qty: 40 CAPSULE | Refills: 0 | Status: SHIPPED | OUTPATIENT
Start: 2022-12-15 | End: 2022-12-25

## 2022-12-15 RX ADMIN — INDOMETHACIN 50 MG: 25 CAPSULE ORAL at 10:08

## 2022-12-15 RX ADMIN — CEPHALEXIN 1000 MG: 500 CAPSULE ORAL at 08:46

## 2022-12-15 ASSESSMENT — ACTIVITIES OF DAILY LIVING (ADL): ADLS_ACUITY_SCORE: 35

## 2022-12-15 ASSESSMENT — ENCOUNTER SYMPTOMS
CHILLS: 0
COLOR CHANGE: 1
MYALGIAS: 1
FEVER: 0

## 2022-12-15 NOTE — ED TRIAGE NOTES
ABCs intact. Pt c/o R 4th toe pain since Saturday. Pt took advil 800mg at 0615.      Triage Assessment     Row Name 12/15/22 0716       Triage Assessment (Adult)    Airway WDL WDL       Respiratory WDL    Respiratory WDL WDL       Skin Circulation/Temperature WDL    Skin Circulation/Temperature WDL WDL       Cardiac WDL    Cardiac WDL WDL       Cognitive/Neuro/Behavioral WDL    Cognitive/Neuro/Behavioral WDL WDL

## 2022-12-15 NOTE — ED PROVIDER NOTES
History   Chief Complaint:  Toe Pain       The history is provided by the patient.      Riya Butt is a 52 year old male who presents with right fourth toe pain and color change. He reports pain beginning at the base of the toe 5 days ago, noting that it feels the same as when he had gout in his left foot one year ago. This morning the patient noticed color change in the toe, as the skin turned purple and red. He mentions dropping a pipe on the right foot about 1 weeks ago, but says this was higher in the foot towards his ankle and did not touch the toes. He does not have pain in the other toes. The patient denies fever and chills. He confirms his history of diabetes mellitus and takes metformin. He says his sugars have been decent.     Review of Systems   Constitutional: Negative for chills and fever.   Musculoskeletal: Positive for myalgias.   Skin: Positive for color change.   All other systems reviewed and are negative.    Allergies:  Penicillins    Medications:  Atorvastatin  Januvia  Lisinopril  Metformin  Actos     Past Medical History:     Type 2 diabetes mellitus  Hyperlipidemia  Hypertension     Social History:  The patient presents to the ED with his daughter  Arrived by private vehicle   PCP: Leeanne Robin     Physical Exam     Patient Vitals for the past 24 hrs:   BP Temp Temp src Pulse Resp SpO2   12/15/22 0717 (!) 144/98 97  F (36.1  C) Temporal 75 18 97 %       Physical Exam  Nursing note and vitals reviewed.  Constitutional:  Appears well-developed and well-nourished.   HENT:   Head:    Atraumatic.   Mouth/Throat:   Oropharynx is clear and moist. No oropharyngeal exudate.   Eyes:    Pupils are equal, round, and reactive to light.   Neck:    Normal range of motion. Neck supple.      No tracheal deviation present. No thyromegaly present.   Cardiovascular:  Normal rate, regular rhythm, no murmur   Pulmonary/Chest: Breath sounds are clear and equal without wheezes or crackles.  Abdominal:    Soft. Bowel sounds are normal. Exhibits no distension and      no mass. There is no tenderness.      There is no rebound and no guarding.   Musculoskeletal:  Right foot exam: Purple/red skin discoloration, tenderness, and slight swelling to the dorsum of the 4th toe overlying the proximal phalanx and 4th MTP joint. No warmth. No lymphangitis. Pain with range of motion of the toe. No angulation. Good dorsalis pedis pulse.   Lymphadenopathy:  No cervical adenopathy.   Neurological:   Alert and oriented to person, place, and time.   Skin:    Skin is warm and dry. No rash noted. No pallor.     Emergency Department Course     Imaging:  XR Toe Right G/E 2 Views   Final Result   IMPRESSION: No osseous erosion to suggest osteomyelitis. The soft   tissues are unremarkable.      JESSY BELLO MD            SYSTEM ID:  [id number]      Report per radiology      Emergency Department Course:     Reviewed:  I reviewed nursing notes, vitals and past medical history    Assessments:  0805 I obtained history and examined the patient as noted above.   0920 I rechecked the patient and explained findings. At this point I feel that the patient is safe for discharge, and the patient agrees.     Interventions:  0846 Keflex 1000 mg PO   Indocin 50 mg PO    Disposition:  The patient was discharged to home.     Impression & Plan     Medical Decision Making:  This patient is diabetic and has a history of gout with feeling the same symptoms with his gout in the past, however I am concerned about the possibility of cellulitis of his toe as a potential cause so he was placed on Keflex for 10-day course and given the first dose here.  There was no sign of sepsis, systemic infection, osteomyelitis, or necrotizing fasciitis.  I also considered the possibly of a toe contusion in the differential since he did drop something on his foot recently but x-rays did not show any sign of fracture.  I considered gout in the differential diagnosis also so  he was prescribed Indocin to use as needed. The was told signs and symptoms to return for including any worsening cellulitis symptoms, worsening pain or redness, red streak up the foot or leg, or fever.  He was told to keep the foot elevated as much as possible and perform soapy water soaks 2-3 times daily.  He was told to follow-up in clinic tomorrow for recheck.     Diagnosis:    ICD-10-CM    1. Cellulitis of fourth toe of right foot  L03.031         Discharge Medications:  New Prescriptions    CEPHALEXIN (KEFLEX) 500 MG CAPSULE    Take 1 capsule (500 mg) by mouth 4 times daily for 10 days    INDOMETHACIN (INDOCIN) 50 MG CAPSULE    Take 1 capsule (50 mg) by mouth 3 times daily as needed for moderate pain (4-6) (Take with food)       Scribe Disclosure:  I, April Muller, am serving as a scribe at 7:41 AM on 12/15/2022 to document services personally performed by Darshana Hernandez MD based on my observations and the provider's statements to me.       Darshana Hernandez MD  12/15/22 1000

## 2022-12-15 NOTE — DISCHARGE INSTRUCTIONS
Keep your foot elevated as much as possible.    Soak foot in warm soapy water or epsom salts for 15-20 min 2-3 times daily

## 2024-10-27 ENCOUNTER — HOSPITAL ENCOUNTER (EMERGENCY)
Facility: CLINIC | Age: 54
Discharge: HOME | End: 2024-10-27
Attending: EMERGENCY MEDICINE | Admitting: EMERGENCY MEDICINE
Payer: COMMERCIAL

## 2024-10-27 VITALS
SYSTOLIC BLOOD PRESSURE: 131 MMHG | HEIGHT: 69 IN | TEMPERATURE: 98.7 F | DIASTOLIC BLOOD PRESSURE: 88 MMHG | RESPIRATION RATE: 18 BRPM | HEART RATE: 88 BPM | BODY MASS INDEX: 30.37 KG/M2 | WEIGHT: 205.03 LBS | OXYGEN SATURATION: 96 %

## 2024-10-27 DIAGNOSIS — L03.221 CELLULITIS, NECK: ICD-10-CM

## 2024-10-27 PROCEDURE — 76536 US EXAM OF HEAD AND NECK: CPT

## 2024-10-27 PROCEDURE — 99284 EMERGENCY DEPT VISIT MOD MDM: CPT | Mod: 25

## 2024-10-27 RX ORDER — CEPHALEXIN 500 MG/1
500 CAPSULE ORAL 4 TIMES DAILY
Qty: 56 CAPSULE | Refills: 0 | Status: SHIPPED | OUTPATIENT
Start: 2024-10-27 | End: 2024-11-03

## 2024-10-27 RX ORDER — SULFAMETHOXAZOLE AND TRIMETHOPRIM 800; 160 MG/1; MG/1
1 TABLET ORAL 2 TIMES DAILY
Qty: 28 TABLET | Refills: 0 | Status: SHIPPED | OUTPATIENT
Start: 2024-10-27 | End: 2024-11-03

## 2024-10-27 ASSESSMENT — ACTIVITIES OF DAILY LIVING (ADL): ADLS_ACUITY_SCORE: 0

## 2024-10-27 ASSESSMENT — COLUMBIA-SUICIDE SEVERITY RATING SCALE - C-SSRS
1. IN THE PAST MONTH, HAVE YOU WISHED YOU WERE DEAD OR WISHED YOU COULD GO TO SLEEP AND NOT WAKE UP?: NO
2. HAVE YOU ACTUALLY HAD ANY THOUGHTS OF KILLING YOURSELF IN THE PAST MONTH?: NO
6. HAVE YOU EVER DONE ANYTHING, STARTED TO DO ANYTHING, OR PREPARED TO DO ANYTHING TO END YOUR LIFE?: NO
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
## 2024-10-27 NOTE — ED TRIAGE NOTES
Pt reports abscess to his neck started 2-3 weeks ago. Pt was seen in [facility] and took 2 antibiotics for it. Pt reports the abscess is coming back and can feel pressure in the area

## 2024-11-03 ENCOUNTER — HOSPITAL ENCOUNTER (EMERGENCY)
Facility: CLINIC | Age: 54
Discharge: HOME | End: 2024-11-03
Attending: STUDENT IN AN ORGANIZED HEALTH CARE EDUCATION/TRAINING PROGRAM | Admitting: STUDENT IN AN ORGANIZED HEALTH CARE EDUCATION/TRAINING PROGRAM
Payer: COMMERCIAL

## 2024-11-03 ENCOUNTER — APPOINTMENT (OUTPATIENT)
Dept: CT IMAGING | Facility: CLINIC | Age: 54
End: 2024-11-03
Attending: STUDENT IN AN ORGANIZED HEALTH CARE EDUCATION/TRAINING PROGRAM
Payer: COMMERCIAL

## 2024-11-03 VITALS
OXYGEN SATURATION: 98 % | BODY MASS INDEX: 30.04 KG/M2 | SYSTOLIC BLOOD PRESSURE: 126 MMHG | DIASTOLIC BLOOD PRESSURE: 90 MMHG | RESPIRATION RATE: 18 BRPM | HEIGHT: 69 IN | HEART RATE: 90 BPM | WEIGHT: 202.82 LBS | TEMPERATURE: 96.7 F

## 2024-11-03 DIAGNOSIS — L02.11 NECK ABSCESS: ICD-10-CM

## 2024-11-03 DIAGNOSIS — R73.9 HYPERGLYCEMIA: ICD-10-CM

## 2024-11-03 LAB
ANION GAP SERPL CALCULATED.3IONS-SCNC: 16 MMOL/L (ref 7–15)
B-OH-BUTYR SERPL-SCNC: 0.35 MMOL/L
BASOPHILS # BLD AUTO: 0.2 10E3/UL (ref 0–0.2)
BASOPHILS NFR BLD AUTO: 2 %
BUN SERPL-MCNC: 11.8 MG/DL (ref 6–20)
CALCIUM SERPL-MCNC: 9.2 MG/DL (ref 8.8–10.4)
CHLORIDE SERPL-SCNC: 92 MMOL/L (ref 98–107)
CREAT SERPL-MCNC: 0.58 MG/DL (ref 0.67–1.17)
EGFRCR SERPLBLD CKD-EPI 2021: >90 ML/MIN/1.73M2
EOSINOPHIL # BLD AUTO: 0.2 10E3/UL (ref 0–0.7)
EOSINOPHIL NFR BLD AUTO: 2 %
ERYTHROCYTE [DISTWIDTH] IN BLOOD BY AUTOMATED COUNT: 12.7 % (ref 10–15)
EST. AVERAGE GLUCOSE BLD GHB EST-MCNC: 312 MG/DL
GLUCOSE SERPL-MCNC: 407 MG/DL (ref 70–99)
HBA1C MFR BLD: 12.5 %
HCO3 SERPL-SCNC: 20 MMOL/L (ref 22–29)
HCT VFR BLD AUTO: 45.6 % (ref 40–53)
HGB BLD-MCNC: 15.8 G/DL (ref 13.3–17.7)
IMM GRANULOCYTES # BLD: 0.1 10E3/UL
IMM GRANULOCYTES NFR BLD: 1 %
LACTATE SERPL-SCNC: 1.7 MMOL/L (ref 0.7–2)
LYMPHOCYTES # BLD AUTO: 2.3 10E3/UL (ref 0.8–5.3)
LYMPHOCYTES NFR BLD AUTO: 23 %
MCH RBC QN AUTO: 27.3 PG (ref 26.5–33)
MCHC RBC AUTO-ENTMCNC: 34.6 G/DL (ref 31.5–36.5)
MCV RBC AUTO: 79 FL (ref 78–100)
MONOCYTES # BLD AUTO: 1.1 10E3/UL (ref 0–1.3)
MONOCYTES NFR BLD AUTO: 11 %
MRSA DNA SPEC QL NAA+PROBE: NEGATIVE
NEUTROPHILS # BLD AUTO: 6.2 10E3/UL (ref 1.6–8.3)
NEUTROPHILS NFR BLD AUTO: 61 %
NRBC # BLD AUTO: 0 10E3/UL
NRBC BLD AUTO-RTO: 0 /100
PLATELET # BLD AUTO: 358 10E3/UL (ref 150–450)
POTASSIUM SERPL-SCNC: 4.9 MMOL/L (ref 3.4–5.3)
RBC # BLD AUTO: 5.78 10E6/UL (ref 4.4–5.9)
SA TARGET DNA: NEGATIVE
SODIUM SERPL-SCNC: 128 MMOL/L (ref 135–145)
WBC # BLD AUTO: 10.1 10E3/UL (ref 4–11)

## 2024-11-03 PROCEDURE — 80048 BASIC METABOLIC PNL TOTAL CA: CPT | Performed by: STUDENT IN AN ORGANIZED HEALTH CARE EDUCATION/TRAINING PROGRAM

## 2024-11-03 PROCEDURE — 36415 COLL VENOUS BLD VENIPUNCTURE: CPT | Performed by: STUDENT IN AN ORGANIZED HEALTH CARE EDUCATION/TRAINING PROGRAM

## 2024-11-03 PROCEDURE — 87641 MR-STAPH DNA AMP PROBE: CPT | Performed by: STUDENT IN AN ORGANIZED HEALTH CARE EDUCATION/TRAINING PROGRAM

## 2024-11-03 PROCEDURE — 82010 KETONE BODYS QUAN: CPT | Performed by: STUDENT IN AN ORGANIZED HEALTH CARE EDUCATION/TRAINING PROGRAM

## 2024-11-03 PROCEDURE — 83605 ASSAY OF LACTIC ACID: CPT | Performed by: STUDENT IN AN ORGANIZED HEALTH CARE EDUCATION/TRAINING PROGRAM

## 2024-11-03 PROCEDURE — 250N000011 HC RX IP 250 OP 636: Performed by: STUDENT IN AN ORGANIZED HEALTH CARE EDUCATION/TRAINING PROGRAM

## 2024-11-03 PROCEDURE — 83036 HEMOGLOBIN GLYCOSYLATED A1C: CPT | Performed by: STUDENT IN AN ORGANIZED HEALTH CARE EDUCATION/TRAINING PROGRAM

## 2024-11-03 PROCEDURE — 99285 EMERGENCY DEPT VISIT HI MDM: CPT | Mod: 25

## 2024-11-03 PROCEDURE — 70491 CT SOFT TISSUE NECK W/DYE: CPT

## 2024-11-03 PROCEDURE — 10060 I&D ABSCESS SIMPLE/SINGLE: CPT

## 2024-11-03 PROCEDURE — 85025 COMPLETE CBC W/AUTO DIFF WBC: CPT | Performed by: STUDENT IN AN ORGANIZED HEALTH CARE EDUCATION/TRAINING PROGRAM

## 2024-11-03 RX ORDER — DOXYCYCLINE 100 MG/1
100 CAPSULE ORAL 2 TIMES DAILY
Qty: 20 CAPSULE | Refills: 0 | Status: SHIPPED | OUTPATIENT
Start: 2024-11-03 | End: 2024-11-13

## 2024-11-03 RX ORDER — LIDOCAINE HYDROCHLORIDE AND EPINEPHRINE 10; 10 MG/ML; UG/ML
INJECTION, SOLUTION INFILTRATION; PERINEURAL
Status: DISCONTINUED
Start: 2024-11-03 | End: 2024-11-03 | Stop reason: HOSPADM

## 2024-11-03 RX ORDER — IOPAMIDOL 755 MG/ML
45 INJECTION, SOLUTION INTRAVASCULAR ONCE
Status: COMPLETED | OUTPATIENT
Start: 2024-11-03 | End: 2024-11-03

## 2024-11-03 RX ORDER — LIDOCAINE HYDROCHLORIDE AND EPINEPHRINE 10; 10 MG/ML; UG/ML
10 INJECTION, SOLUTION INFILTRATION; PERINEURAL ONCE
Status: DISCONTINUED | OUTPATIENT
Start: 2024-11-03 | End: 2024-11-03 | Stop reason: HOSPADM

## 2024-11-03 RX ADMIN — IOPAMIDOL 90 ML: 755 INJECTION, SOLUTION INTRAVENOUS at 12:13

## 2024-11-03 ASSESSMENT — ACTIVITIES OF DAILY LIVING (ADL)
ADLS_ACUITY_SCORE: 0

## 2024-11-03 ASSESSMENT — COLUMBIA-SUICIDE SEVERITY RATING SCALE - C-SSRS
6. HAVE YOU EVER DONE ANYTHING, STARTED TO DO ANYTHING, OR PREPARED TO DO ANYTHING TO END YOUR LIFE?: NO
1. IN THE PAST MONTH, HAVE YOU WISHED YOU WERE DEAD OR WISHED YOU COULD GO TO SLEEP AND NOT WAKE UP?: NO
2. HAVE YOU ACTUALLY HAD ANY THOUGHTS OF KILLING YOURSELF IN THE PAST MONTH?: NO

## 2024-11-03 NOTE — ED TRIAGE NOTES
Pt here for abscess on the back of his neck. Is on day 6 of abx. Presents because it is not decreasing in size. Has been evaluated multiple times.

## 2024-11-03 NOTE — DISCHARGE INSTRUCTIONS
If you develop a fever, worsening pain, redness spreading from your wound, please come back to the ER right away.  I want you to have a wound check in 5 days for packing removal.  Discontinue Bactrim and start doxycycline.
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
